# Patient Record
Sex: FEMALE | Race: BLACK OR AFRICAN AMERICAN | NOT HISPANIC OR LATINO | Employment: FULL TIME | URBAN - METROPOLITAN AREA
[De-identification: names, ages, dates, MRNs, and addresses within clinical notes are randomized per-mention and may not be internally consistent; named-entity substitution may affect disease eponyms.]

---

## 2019-03-20 ENCOUNTER — OFFICE VISIT (OUTPATIENT)
Dept: URGENT CARE | Facility: CLINIC | Age: 19
End: 2019-03-20
Payer: COMMERCIAL

## 2019-03-20 VITALS
OXYGEN SATURATION: 100 % | HEIGHT: 56 IN | SYSTOLIC BLOOD PRESSURE: 124 MMHG | TEMPERATURE: 99.3 F | HEART RATE: 89 BPM | DIASTOLIC BLOOD PRESSURE: 80 MMHG | RESPIRATION RATE: 16 BRPM | WEIGHT: 106 LBS | BODY MASS INDEX: 23.84 KG/M2

## 2019-03-20 DIAGNOSIS — J02.9 SORE THROAT: ICD-10-CM

## 2019-03-20 DIAGNOSIS — J11.1 INFLUENZA: ICD-10-CM

## 2019-03-20 LAB
INT CON NEG: NEGATIVE
INT CON POS: POSITIVE
S PYO AG THROAT QL: NEGATIVE

## 2019-03-20 PROCEDURE — 87880 STREP A ASSAY W/OPTIC: CPT | Performed by: EMERGENCY MEDICINE

## 2019-03-20 PROCEDURE — 99203 OFFICE O/P NEW LOW 30 MIN: CPT | Performed by: EMERGENCY MEDICINE

## 2019-03-20 RX ORDER — OSELTAMIVIR PHOSPHATE 75 MG/1
75 CAPSULE ORAL 2 TIMES DAILY
Qty: 10 CAP | Refills: 0 | Status: SHIPPED | OUTPATIENT
Start: 2019-03-20 | End: 2020-02-23

## 2019-03-20 ASSESSMENT — ENCOUNTER SYMPTOMS
FEVER: 1
HEADACHES: 1
COUGH: 1
FOCAL WEAKNESS: 0
NAUSEA: 1
FATIGUE: 1
SENSORY CHANGE: 0
MYALGIAS: 1
SWOLLEN GLANDS: 1
VOMITING: 0
SHORTNESS OF BREATH: 0
HEMOPTYSIS: 0
ARTHRALGIAS: 1
CHANGE IN BOWEL HABIT: 0
WHEEZING: 0
ABDOMINAL PAIN: 0
SPUTUM PRODUCTION: 0
SINUS PAIN: 0
SORE THROAT: 1
DIARRHEA: 0

## 2019-03-20 NOTE — LETTER
March 20, 2019       Patient: Brenda Stephen   YOB: 2000   Date of Visit: 3/20/2019         To Whom It May Concern:    Brenda Stephen is not able to attend work March 19-21, 2019 for medical reasons.      Sincerely,          Golden Zhong M.D.  Electronically Signed

## 2019-03-21 NOTE — PROGRESS NOTES
"Subjective:      Brenda Stephen is a 18 y.o. female who presents with Pharyngitis (hx of strep sore throat, nose stuffy,coughing, sneezing, bodyache x last night)            Influenza   This is a new problem. The current episode started yesterday. The problem occurs daily. The problem has been gradually worsening. Associated symptoms include arthralgias, congestion, coughing, fatigue, a fever, headaches, myalgias, nausea, a sore throat and swollen glands. Pertinent negatives include no abdominal pain, change in bowel habit, chest pain, rash, urinary symptoms or vomiting. The symptoms are aggravated by swallowing. She has tried rest for the symptoms. The treatment provided no relief.   PMH anemia  Review of Systems   Constitutional: Positive for fatigue and fever.   HENT: Positive for congestion, nosebleeds and sore throat. Negative for ear pain and sinus pain.    Respiratory: Positive for cough. Negative for hemoptysis, sputum production, shortness of breath and wheezing.    Cardiovascular: Negative for chest pain.   Gastrointestinal: Positive for nausea. Negative for abdominal pain, change in bowel habit, diarrhea and vomiting.   Musculoskeletal: Positive for arthralgias and myalgias.   Skin: Negative for rash.   Neurological: Positive for headaches. Negative for sensory change and focal weakness.   Endo/Heme/Allergies: Negative for environmental allergies.     PMH:  has no past medical history on file.  MEDS:   Current Outpatient Prescriptions:   •  oseltamivir (TAMIFLU) 75 MG Cap, Take 1 Cap by mouth 2 times a day., Disp: 10 Cap, Rfl: 0  ALLERGIES: Not on File  SURGHX: History reviewed. No pertinent surgical history.  SOCHX:  reports that she has quit smoking. She has never used smokeless tobacco. She reports that she drinks alcohol. She reports that she does not use drugs.  FH: family history is not on file.       Objective:     /80   Pulse 89   Temp 37.4 °C (99.3 °F)   Resp 16   Ht 1.422 m (4' 8\")   Wt " 48.1 kg (106 lb)   SpO2 100%   BMI 23.76 kg/m²      Physical Exam   Constitutional: She is oriented to person, place, and time. She appears well-developed and well-nourished. She is cooperative.  Non-toxic appearance. No distress.   HENT:   Head: Normocephalic.   Right Ear: Tympanic membrane and ear canal normal.   Left Ear: Tympanic membrane and ear canal normal.   Nose: Mucosal edema present. No rhinorrhea.   Mouth/Throat: No trismus in the jaw. No uvula swelling. Posterior oropharyngeal erythema present. No oropharyngeal exudate, posterior oropharyngeal edema or tonsillar abscesses. Tonsils are 1+ on the right. Tonsils are 1+ on the left. No tonsillar exudate.   Eyes: Conjunctivae are normal.   Neck: Trachea normal. Neck supple.   Cardiovascular: Normal rate, regular rhythm and normal heart sounds.    Pulmonary/Chest: Effort normal and breath sounds normal.   Lymphadenopathy:     She has cervical adenopathy.        Right cervical: Superficial cervical adenopathy present. No posterior cervical adenopathy present.       Left cervical: Superficial cervical adenopathy present. No posterior cervical adenopathy present.   Neurological: She is alert and oriented to person, place, and time.   Skin: Skin is warm and dry.   Psychiatric: She has a normal mood and affect.               Assessment/Plan:     1. Sore throat  negative- POCT Rapid Strep A    2. Influenza  Recommended supportive care measures, including rest, increasing oral fluid intake and use of over-the-counter medications for relief of symptoms.  Offered:  - oseltamivir (TAMIFLU) 75 MG Cap; Take 1 Cap by mouth 2 times a day.  Dispense: 10 Cap; Refill: 0

## 2019-10-15 ENCOUNTER — TELEPHONE (OUTPATIENT)
Dept: SCHEDULING | Facility: IMAGING CENTER | Age: 19
End: 2019-10-15

## 2019-10-26 ENCOUNTER — OCCUPATIONAL MEDICINE (OUTPATIENT)
Dept: URGENT CARE | Facility: CLINIC | Age: 19
End: 2019-10-26
Payer: COMMERCIAL

## 2019-10-26 VITALS
BODY MASS INDEX: 22.5 KG/M2 | WEIGHT: 100 LBS | RESPIRATION RATE: 18 BRPM | HEART RATE: 100 BPM | HEIGHT: 56 IN | OXYGEN SATURATION: 98 % | SYSTOLIC BLOOD PRESSURE: 110 MMHG | DIASTOLIC BLOOD PRESSURE: 68 MMHG | TEMPERATURE: 99.9 F

## 2019-10-26 DIAGNOSIS — M77.8 TENDINITIS OF BOTH HANDS: ICD-10-CM

## 2019-10-26 PROCEDURE — 99213 OFFICE O/P EST LOW 20 MIN: CPT | Performed by: EMERGENCY MEDICINE

## 2019-10-26 ASSESSMENT — ENCOUNTER SYMPTOMS
NECK PAIN: 0
SENSORY CHANGE: 0

## 2019-10-26 NOTE — PROGRESS NOTES
"Subjective:      Brenda Stephen is a 18 y.o. female who presents with Hand Pain (Both hands, wrists, arms going on for 2 months )      Date of injury: 10/24/2019. Injured at work: no specific trauma; notes onset of left hand/wrist/forearm pain associated with gripping trigger activity. Previous injury: Notes onset of right hand/wrist/forearm pain associated with work activity 2 months ago.  No specific trauma. Outside activity: none. Contributing medical illness: none. Severity: moderate, severe. Prior treatment: Wrist splints, ibuprofen. Location: bilateral palmar hands, wrists, forearms. Numbness/tingling: none. Focal weakness: none. Fever: none.     HPI    Review of Systems   Musculoskeletal: Negative for neck pain.   Skin: Negative for rash.   Neurological: Negative for sensory change.     PMH: No pertinent past medical history to this problem  MEDS: Medications were reviewed in EMR  ALLERGIES: Allergies were reviewed in EMR  SOCHX: Works as a   FH: No pertinent family history to this problem       Objective:     /68   Pulse 100   Temp 37.7 °C (99.9 °F) (Temporal)   Resp 18   Ht 1.422 m (4' 8\")   Wt 45.4 kg (100 lb)   SpO2 98%   BMI 22.42 kg/m²      Physical Exam    General: Alert, cooperative, no acute distress.  Body habitus asthenic.  Musculoskeletal- bilateral arms: Mild tenderness without swelling bilateral distal volar forearms, anterior wrists, proximal hands.  Tendon function deficit, normal joint range of motion.  Vascular: Bilateral radial pulses intact, cap refill intact.  Neurological: Normal bilateral motor function, sensory to light touch grossly intact bilaterally.  Skin: Warm, dry, intact.       Assessment/Plan:     1. Tendinitis of both hands  C4, D39 form completed.  On wrist splints as needed, ibuprofen as needed  Referral to occupational medicine    "

## 2019-10-26 NOTE — LETTER
"EMPLOYEE’S CLAIM FOR COMPENSATION/ REPORT OF INITIAL TREATMENT  FORM C-4    EMPLOYEE’S CLAIM - PROVIDE ALL INFORMATION REQUESTED   First Name  Brenda Last Name  Zafar Birthdate                    2000                Sex  female Claim Number   Home Address  [address] Age  18 y.o. Height  1.422 m (4' 8\") Weight  45.4 kg (100 lb) [facility]     [place] Zip  [address] Telephone  [phone] (home)    Mailing Address  [address] Zip  [address] Primary Language Spoken  English    Insurer  Unknown Third Party   [employer]   Employee's Occupation (Job Title) When Injury or Occupational Disease Occurred      Employer's Name  [employer]  Telephone  [phone]    Employer Address  [address] [place]  Zip  [address]    Date of Injury  10/25/2019               Hour of Injury  11:00 AM Date Employer Notified  10/25/2019 Last Day of Work after Injury or Occupational Disease  10/25/2019 Supervisor to Whom Injury Reported  Kajal Cross   Address or Location of Accident (if applicable)  [[id number]   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 (Be specific an answer in detail. Use additional sheet if necessary)  repetitive motions on production line, and stress on hands and wrist   If you believe that you have an occupational disease, when did you first have knowledge of the disability and it relationship to your employment?  N/A Witnesses to the Accident  N/A      Nature of Injury or Occupational Disease  Defer  Part(s) of Body Injured or Affected  Wrist (L) and Hand (L), Wrist (R) and Hand (R), Lower Arm (R)    I certify that the above is true and correct to the best of my knowledge and that I have provided this information in " order to obtain the benefits of [place] or [place]).  I hereby authorize any physician, chiropractor, surgeon, practitioner, or other person, any hospital, including [facility] or [facility] hospital, any medical service organization, any insurance company, or other institution or organization to release to each other, any medical or other information, including benefits paid or payable, pertinent to this injury or disease, except information relative to diagnosis, treatment and/or counseling for AIDS, psychological conditions, alcohol or controlled substances, for which I must give specific authorization.  A Photostat of this authorization shall be as valid as the original.     Date   Place   Employee’s Signature   THIS REPORT MUST BE COMPLETED AND MAILED WITHIN 3 WORKING DAYS OF TREATMENT   Place  [facility]  Name of [facility]   Date  10/26/2019 Diagnosis  (M77.9) Tendinitis of both hands Is there evidence the injured employee was under the influence of alcohol and/or another controlled substance at the time of accident?   Hour  4:27 PM Description of Injury or Disease  The encounter diagnosis was Tendinitis of both hands. No   Treatment  Wrist splints, OTC ibuprofen  Have you advised the patient to remain off work five days or more? No   X-Ray Findings      If Yes   From Date  To Date      From information given by the employee, together with medical evidence, can you directly connect this injury or occupational disease as job incurred?  No If No Full Duty No Modified Duty  Yes   Is additional medical care by a physician indicated?  Yes If Modified Duty, Specify any Limitations / Restrictions  Limited bilateral hand , lifting   Do you know of any previous injury or disease contributing to this condition or occupational disease?                            No   Date  10/26/2019 Print  "Doctor’s Name Golden Zhong M.D. I certify the employer’s copy of  this form was mailed on:   Address  [address]’s Use Only     [place] Zip  [address]    Provider’s Tax ID Number  [id number] Telephone  Dept: [phone]        manda-GOLDEN Daniels M.D.   e-Signature: Dr. Paul Reina,   Medical Director Degree  MD        ORIGINAL-TREATING PHYSICIAN OR CHIROPRACTOR    PAGE 2-INSURER/TPA    PAGE 3-EMPLOYER    PAGE 4-EMPLOYEE             Form C-4 (rev.10/07)              BRIEF DESCRIPTION OF RIGHTS AND BENEFITS  (Pursuant to [place] 616C.050)    Notice of Injury or Occupational Disease (Incident Report Form C-1): If an injury or occupational disease (OD) arises out of and in the course of employment, you must provide written notice to your employer as soon as practicable, but no later than 7 days after the accident or OD. Your employer shall maintain a sufficient supply of the required forms.    Claim for Compensation (Form C-4): If medical treatment is sought, the form C-4 is available at the place of initial treatment. A completed \"Claim for Compensation\" (Form C-4) must be filed within 90 days after an accident or OD. The treating physician or chiropractor must, within 3 working days after treatment, complete and mail to the employer, the employer's insurer and third-party , the Claim for Compensation.    Medical Treatment: If you require medical treatment for your on-the-job injury or OD, you may be required to select a physician or chiropractor from a list provided by your workers’ compensation insurer, if it has contracted with an Organization for Managed Care (MCO) or Preferred Provider Organization (PPO) or providers of health care. If your employer has not entered into a contract with an MCO or PPO, you may select a physician or chiropractor from the Panel of Physicians and Chiropractors. Any medical costs related to your industrial injury or OD will be paid by " your insurer.    Temporary Total Disability (TTD): If your doctor has certified that you are unable to work for a period of at least 5 consecutive days, or 5 cumulative days in a 20-day period, or places restrictions on you that your employer does not accommodate, you may be entitled to TTD compensation.    Temporary Partial Disability (TPD): If the wage you receive upon reemployment is less than the compensation for TTD to which you are entitled, the insurer may be required to pay you TPD compensation to make up the difference. TPD can only be paid for a maximum of 24 months.    Permanent Partial Disability (PPD): When your medical condition is stable and there is an indication of a PPD as a result of your injury or OD, within 30 days, your insurer must arrange for an evaluation by a rating physician or chiropractor to determine the degree of your PPD. The amount of your PPD award depends on the date of injury, the results of the PPD evaluation and your age and wage.    Permanent Total Disability (PTD): If you are medically certified by a treating physician or chiropractor as permanently and totally disabled and have been granted a PTD status by your insurer, you are entitled to receive monthly benefits not to exceed 66 2/3% of your average monthly wage. The amount of your PTD payments is subject to reduction if you previously received a PPD award.    Vocational Rehabilitation Services: You may be eligible for vocational rehabilitation services if you are unable to return to the job due to a permanent physical impairment or permanent restrictions as a result of your injury or occupational disease.    Transportation and Per Angelica Reimbursement: You may be eligible for travel expenses and per angelica associated with medical treatment.    Reopening: You may be able to reopen your claim if your condition worsens after claim closure.    Appeal Process: If you disagree with a written determination issued by the insurer or  the insurer does not respond to your request, you may appeal to the Department of Administration, , by following the instructions contained in your determination letter. You must appeal the determination within 70 days from the date of the determination letter at [address], [address], [place], [place] [address], or [address], [address], [place], [place] [address]. If you disagree with the  decision, you may appeal to the Department of Administration, . You must file your appeal within 30 days from the date of the  decision letter at [address], [address], [place], [place] [address], or [address], [address], [place], [place] [address]. If you disagree with a decision of an , you may file a petition for judicial review with the District Court. You must do so within 30 days of the Appeal Officer’s decision. You may be represented by an  at your own expense or you may contact the [facility] for possible representation.    [place]  for Injured Workers (NAIW): If you disagree with a  decision, you may request that NAIW represent you without charge at an  Hearing. For information regarding denial of benefits, you may contact the [facility] at: [address], [address], [place] [address], [phone], or [address], [place] [address], [phone]    To File a Complaint with the Division: If you wish to file a complaint with the  of the Division of Industrial Relations (DIR),  please contact the Workers’ Compensation Section, [address], [address], [place], [place] [address], telephone (915) [phone], or [address], [address], [place], [place] [address], telephone [phone]) [phone].    For assistance with Workers’ Compensation Issues: You may contact the Office of the Governor Consumer Health Assistance, [address], [place], [place] [address], Toll Free 1-852.156.9475, Web site: http://govcha.[facility].nv.us, E-mail [email or web address].                   __________________________________________________________________                                                     _________        Employee Name / Signature                                                                                                                                              Date                                                                                                                                                                                                     D-2 (rev. 06/18)

## 2019-10-26 NOTE — LETTER
[facility] [address] [place] [address]  Phone:  [phone] - Fax:  [phone]   [facility] Progress Report and Disability Certification  Date of Service: 10/26/2019   No Show:  No  Date / Time of Next Visit: 10/30/2019 @ 11:30 am w/ occ health   Claim Information   Patient Name: Brenda Stephen  Claim Number:     Employer: [employer]  Date of Injury: 10/25/2019     Insurer / TPA: [employer]  ID / SSN:     Occupation:   Diagnosis: The encounter diagnosis was Tendinitis of both hands.    Medical Information   Related to Industrial Injury? No    Subjective Complaints:  Date of injury: 10/24/2019. Injured at work: no specific trauma; notes onset of left hand/wrist/forearm pain associated with gripping trigger activity. Previous injury: Notes onset of right hand/wrist/forearm pain associated with work activity 2 months ago.  No specific trauma. Outside activity: none. Contributing medical illness: none. Severity: moderate, severe. Prior treatment: Wrist splints, ibuprofen. Location: bilateral palmar hands, wrists, forearms. Numbness/tingling: none. Focal weakness: none. Fever: none.   Objective Findings: General: Alert, cooperative, no acute distress.  Body habitus asthenic.  Musculoskeletal- bilateral arms: Mild tenderness without swelling bilateral distal volar forearms, anterior wrists, proximal hands.  Tendon function deficit, normal joint range of motion.  Vascular: Bilateral radial pulses intact, cap refill intact.  Neurological: Normal bilateral motor function, sensory to light touch grossly intact bilaterally.  Skin: Warm, dry, intact.   Pre-Existing Condition(s):     Assessment:   Initial Visit    Status: Additional Care Required  Permanent Disability:No    Plan: Medication    Diagnostics:      Comments:       Disability Information   Status: Released to Restricted Duty    From:  10/26/2019  Through: 10/30/2019 Restrictions  are:     Physical Restrictions   Sitting:    Standing:    Stooping:    Bending:      Squatting:    Walking:    Climbing:    Pushing:      Pulling:    Other:    Reaching Above Shoulder (L):   Reaching Above Shoulder (R):       Reaching Below Shoulder (L):    Reaching Below Shoulder (R):      Not to exceed Weight Limits   Carrying(hrs):   Weight Limit(lb): < or = to 25 pounds Lifting(hrs):   Weight  Limit(lb): < or = to 25 pounds   Comments:      Repetitive Actions   Hands: i.e. Fine Manipulations from Grasping: < or = to 1 hr/day   Feet: i.e. Operating Foot Controls:     Driving / Operate Machinery:     Physician Name: Golden Zhong M.D. Physician Signature: GOLDEN Phillips M.D. e-Signature: Dr. Paul Reina, Medical Director   Clinic Name / Location: [address] [address] Clinic Phone Number: Dept: [phone]   Appointment Time: 4:00 Pm Visit Start Time: 4:27 PM   Check-In Time:  3:57 Pm Visit Discharge Time:  5:16 PM   Original-Treating Physician or Chiropractor    Page 2-Insurer/TPA    Page 3-Employer    Page 4-Employee

## 2019-10-29 ENCOUNTER — TELEPHONE (OUTPATIENT)
Dept: OCCUPATIONAL MEDICINE | Facility: CLINIC | Age: 19
End: 2019-10-29

## 2019-10-30 ENCOUNTER — OCCUPATIONAL MEDICINE (OUTPATIENT)
Dept: OCCUPATIONAL MEDICINE | Facility: CLINIC | Age: 19
End: 2019-10-30
Payer: COMMERCIAL

## 2019-10-30 VITALS
RESPIRATION RATE: 12 BRPM | HEIGHT: 56 IN | TEMPERATURE: 99 F | WEIGHT: 100 LBS | BODY MASS INDEX: 22.5 KG/M2 | HEART RATE: 88 BPM | OXYGEN SATURATION: 99 %

## 2019-10-30 DIAGNOSIS — M77.8 TENDONITIS OF BOTH WRISTS: ICD-10-CM

## 2019-10-30 PROCEDURE — 99213 OFFICE O/P EST LOW 20 MIN: CPT | Mod: 29 | Performed by: NURSE PRACTITIONER

## 2019-10-30 ASSESSMENT — ENCOUNTER SYMPTOMS
RESPIRATORY NEGATIVE: 1
MYALGIAS: 1
CARDIOVASCULAR NEGATIVE: 1
CONSTITUTIONAL NEGATIVE: 1
PSYCHIATRIC NEGATIVE: 1
TINGLING: 0
SENSORY CHANGE: 1

## 2019-10-30 ASSESSMENT — PAIN SCALES - GENERAL: PAINLEVEL: 4=SLIGHT-MODERATE PAIN

## 2019-10-30 NOTE — PROGRESS NOTES
"Subjective:      Brenda Stephen is a 18 y.o. female who presents with Follow-Up (WC DOI 10/25/2019 R/L Arms)      Date of injury: 10/24/2019. Injured at work: no specific trauma; notes onset of left hand/wrist/forearm pain associated with gripping trigger activity. Previous injury: Notes onset of right hand/wrist/forearm pain associated with work activity 2 months ago.      Today patient states overall no improvement with wrist pain.  Pain is described as constant burning that radiates from wrist to fingertips in both hands, dull, and achy.  Symptoms are exacerbated with repetitive movements and relieved with rest.  Patient states that she has been able to rest her wrist because she has been off work for the last few days.  Patient states that she is tried ibuprofen with mild relief of swelling.  Patient states that she is tried ice and heat, heat feels better.  Patient states that she has been wearing wrist braces on both hands which provides some relief.  Patient states that has not returned to work yet, unsure if work restrictions will be helpful.  Plan of care discussed with patient.     HPI    Review of Systems   Constitutional: Negative.    Respiratory: Negative.    Cardiovascular: Negative.    Musculoskeletal: Positive for joint pain and myalgias.   Skin: Negative.    Neurological: Positive for sensory change. Negative for tingling.   Psychiatric/Behavioral: Negative.         ROS: All systems were reviewed on intake form, form was reviewed and signed. See scanned documents in media. Pertinent positives and negatives included in HPI.    PMH: No pertinent past medical history to this problem  MEDS: Medications were reviewed in Epic  ALLERGIES: No Known Allergies  SOCHX: Works as  at [employer]   FH: No pertinent family history to this problem         Objective:     Pulse 88   Temp 37.2 °C (99 °F) (Temporal)   Resp 12   Ht 1.422 m (4' 8\")   Wt 45.4 kg (100 lb)   SpO2 99%   BMI 22.42 kg/m²  "     Physical Exam   Constitutional: She is oriented to person, place, and time. She appears well-developed and well-nourished.   Cardiovascular: Normal rate and regular rhythm.   Pulmonary/Chest: Effort normal.   Musculoskeletal: Normal range of motion. She exhibits tenderness. She exhibits no edema or deformity.        Right wrist: She exhibits tenderness. She exhibits normal range of motion, no swelling, no crepitus and no deformity.        Left wrist: She exhibits tenderness and bony tenderness. She exhibits normal range of motion, no crepitus and no deformity.        Arms:  Neurological: She is alert and oriented to person, place, and time.   Skin: Skin is warm and dry. Capillary refill takes less than 2 seconds. No erythema.   Psychiatric: She has a normal mood and affect. Her behavior is normal.       Mild tenderness without swelling bilateral distal volar forearms, anterior wrists, proximal hands.  Tendon function deficit, normal joint range of motion.  Vascular: Bilateral radial pulses intact, cap refill intact.  Neurological: Normal bilateral motor function, sensory to light touch grossly intact bilaterally  Tinel's and Phalen's positive  Finkelstein negative       Assessment/Plan:     1. Tendonitis of both wrists      Follow-up in 2 weeks   Work restrictions applied   Hand therapy referral placed   Continue with over-the-counter ibuprofen as needed   Apply diclofenac gel to the affected areas as prescribed   Continue with gentle range of motion and stretching as tolerated   Continue with ice and heat as tolerated   Continue with wrist brace as tolerated

## 2019-10-30 NOTE — LETTER
[address],   [address] [place] [address]  Phone:  [phone] - Fax:  [phone]   [facility] Progress Report and Disability Certification  Date of Service: 10/30/2019   No Show:  No  Date / Time of Next Visit: 11/13/2019 @ 11:30am   Claim Information   Patient Name: Brenda Stephen  Claim Number:     Employer: [employer]  Date of Injury: 10/25/2019     Insurer / TPA: [employer]  ID / SSN:     Occupation:   Diagnosis: The encounter diagnosis was Tendonitis of both wrists.    Medical Information   Related to Industrial Injury?   Comments:Indeterminate    Subjective Complaints:  Date of injury: 10/24/2019. Injured at work: no specific trauma; notes onset of left hand/wrist/forearm pain associated with gripping trigger activity. Previous injury: Notes onset of right hand/wrist/forearm pain associated with work activity 2 months ago.      Today patient states overall no improvement with wrist pain.  Pain is described as constant burning that radiates from wrist to fingertips in both hands, dull, and achy.  Symptoms are exacerbated with repetitive movements and relieved with rest.  Patient states that she has been able to rest her wrist because she has been off work for the last few days.  Patient states that she is tried ibuprofen with mild relief of swelling.  Patient states that she is tried ice and heat, heat feels better.  Patient states that she has been wearing wrist braces on both hands which provides some relief.  Patient states that has not returned to work yet, unsure if work restrictions will be helpful.  Plan of care discussed with patient.   Objective Findings: Mild tenderness without swelling bilateral distal volar forearms, anterior wrists, proximal hands.  Tendon function deficit, normal joint range of motion.  Vascular: Bilateral radial pulses intact, cap refill intact.  Neurological: Normal bilateral motor  function, sensory to light touch grossly intact bilaterally  Tinel's and Phalen's positive  Finkelstein negative   Pre-Existing Condition(s):     Assessment:   Condition Same    Status: Additional Care Required  Permanent Disability:No    Plan: PT    Diagnostics:      Comments:  Follow-up in 2 weeks  Work restrictions applied  Hand therapy referral placed  Continue with over-the-counter ibuprofen as needed  Apply diclofenac gel to the affected areas as prescribed  Continue with gentle range of motion and stretching as tolera  catia  Continue with ice and heat as tolerated  Continue with wrist brace as tolerated      Disability Information   Status: Released to Restricted Duty    From:  10/30/2019  Through: 11/13/2019 Restrictions are: Temporary   Physical Restrictions   Sitting:    Standing:    Stooping:    Bending:      Squatting:    Walking:    Climbing:    Pushing:  < or = to 2 hrs/day   Pulling:  < or = to 2 hrs/day Other:    Reaching Above Shoulder (L):   Reaching Above Shoulder (R):       Reaching Below Shoulder (L):    Reaching Below Shoulder (R):      Not to exceed Weight Limits   Carrying(hrs):   Weight Limit(lb): < or = to 10 pounds Lifting(hrs):   Weight  Limit(lb): < or = to 10 pounds   Comments: May take 15-minute breaks every 2 hours to manage symptoms.  Rotate stations and fine manipulation with grasping every 2 hours.    Repetitive Actions   Hands: i.e. Fine Manipulations from Grasping: < or = to 4 hrs/day  Comments:Lipid right and left hands and wrists   Feet: i.e. Operating Foot Controls:     Driving / Operate Machinery:     Physician Name: BE Garcia Physician Signature:   e-Signature: Dr. Paul Reina, Medical Director   Clinic Name / Location: [address],   [address] [address] Clinic Phone Number: Dept: [phone]   Appointment Time: 11:30 Am Visit Start Time: 11:35 AM   Check-In Time:  11:20 Am Visit Discharge Time:  12:30am      Original-Treating Physician or Chiropractor    Page 2-Insurer/TPA    Page 3-Employer    Page 4-Employee

## 2019-11-13 ENCOUNTER — OCCUPATIONAL MEDICINE (OUTPATIENT)
Dept: OCCUPATIONAL MEDICINE | Facility: CLINIC | Age: 19
End: 2019-11-13
Payer: COMMERCIAL

## 2019-11-13 VITALS
OXYGEN SATURATION: 98 % | TEMPERATURE: 98.9 F | SYSTOLIC BLOOD PRESSURE: 120 MMHG | DIASTOLIC BLOOD PRESSURE: 76 MMHG | HEIGHT: 59 IN | BODY MASS INDEX: 20.16 KG/M2 | RESPIRATION RATE: 14 BRPM | WEIGHT: 100 LBS

## 2019-11-13 DIAGNOSIS — M77.8 TENDONITIS OF BOTH WRISTS: ICD-10-CM

## 2019-11-13 PROCEDURE — 99213 OFFICE O/P EST LOW 20 MIN: CPT | Mod: 29 | Performed by: NURSE PRACTITIONER

## 2019-11-13 ASSESSMENT — ENCOUNTER SYMPTOMS
CONSTITUTIONAL NEGATIVE: 1
SENSORY CHANGE: 1
MYALGIAS: 1
RESPIRATORY NEGATIVE: 1
CARDIOVASCULAR NEGATIVE: 1
PSYCHIATRIC NEGATIVE: 1
WEAKNESS: 1
TINGLING: 1

## 2019-11-13 NOTE — PROGRESS NOTES
"Subjective:      Brenda Stephen is a 18 y.o. female who presents with Follow-Up (WC DOI 10/24/2019 Rt and Lt Arms, same, rm 16)      Date of injury: 10/24/2019. Injured at work: no specific trauma; notes onset of left hand/wrist/forearm pain associated with gripping trigger activity. Previous injury: Notes onset of right hand/wrist/forearm pain associated with work activity 2 months ago.       Today patient states overall no improvement with wrist pain.  Pain is described as constant tightness, burning that radiates from wrist to fingertips in both hands, dull, and achy pain.  Symptoms are exacerbated with repetitive movements and relieved with rest.  Patient ibuprofen provides minimal relief of swelling, but helps with the tightness.  Patient states that she is tried ice and heat, heat feels better.  Patient states that she has been wearing wrist braces on both hands which provides some relief, especially at night.  Patient is tolerating work restrictions.  Patient provided hand therapy referral information so that she can schedule her appointments. Plan of care discussed with patient.      HPI    Review of Systems   Constitutional: Negative.    Respiratory: Negative.    Cardiovascular: Negative.    Musculoskeletal: Positive for joint pain and myalgias.   Skin: Negative.    Neurological: Positive for tingling, sensory change and weakness.   Psychiatric/Behavioral: Negative.         [employer]CH[employer]: Works as a  at [employer]  FH: No pertinent family history to this problem.         Objective:     /76   Temp 37.2 °C (98.9 °F)   Resp 14   Ht 1.499 m (4' 11\")   Wt 45.4 kg (100 lb)   SpO2 98%   BMI 20.20 kg/m²      Physical Exam  Constitutional:       Appearance: Normal appearance.   Cardiovascular:      Rate and Rhythm: Normal rate and regular rhythm.   Pulmonary:      Effort: Pulmonary effort is normal.   Musculoskeletal: Normal range of motion.         General: Tenderness and signs of injury present. " No swelling or deformity.        Arms:    Skin:     General: Skin is warm and dry.      Capillary Refill: Capillary refill takes less than 2 seconds.      Findings: No bruising or erythema.   Neurological:      General: No focal deficit present.      Mental Status: She is alert and oriented to person, place, and time.      Cranial Nerves: No cranial nerve deficit.      Sensory: Sensory deficit present.      Motor: No weakness.      Coordination: Coordination normal.      Gait: Gait normal.      Deep Tendon Reflexes: Reflexes normal.   Psychiatric:         Mood and Affect: Mood normal.         Behavior: Behavior normal.         Mild tenderness without swelling bilateral distal volar forearms, anterior wrists, proximal hands.  Tendon function deficit, normal joint range of motion.  Vascular: Bilateral radial pulses intact, cap refill intact.  Neurological: Normal bilateral motor function, sensory to light touch grossly intact bilaterally  Tinel's and Phalen's positive  Finkelstein negative       Assessment/Plan:       1. Tendonitis of both wrists      Follow-up in 3 weeks   Work restrictions applied   Hand therapy appointments as scheduled   Continue with OTC ibuprofen as needed   Apply diclofenac gel to the affected areas as prescribed   Continue with gentle range of motion and stretching as tolerated   Continue with ice and heat as tolerated   Continue with wrist brace as tolerated

## 2019-11-13 NOTE — LETTER
[address],   [address] [place] [address]  Phone:  [phone] - Fax:  [phone]   [facility] Progress Report and Disability Certification  Date of Service: 11/13/2019   No Show:  No  Date / Time of Next Visit: 12/3/19 @ 11:30AM   Claim Information   Patient Name: Brenda Stephen  Claim Number:     Employer: [employer]  Date of Injury: 10/25/2019     Insurer / TPA: [employer]  ID / SSN:     Occupation:   Diagnosis: The encounter diagnosis was Tendonitis of both wrists.    Medical Information   Related to Industrial Injury?   Comments:Indeterminant    Subjective Complaints:  Date of injury: 10/24/2019. Injured at work: no specific trauma; notes onset of left hand/wrist/forearm pain associated with gripping trigger activity. Previous injury: Notes onset of right hand/wrist/forearm pain associated with work activity 2 months ago.       Today patient states overall no improvement with wrist pain.  Pain is described as constant tightness, burning that radiates from wrist to fingertips in both hands, dull, and achy pain.  Symptoms are exacerbated with repetitive movements and relieved with rest.  Patient ibuprofen provides minimal relief of swelling, but helps with the tightness.  Patient states that she is tried ice and heat, heat feels better.  Patient states that she has been wearing wrist braces on both hands which provides some relief, especially at night.  Patient is tolerating work restrictions.  Patient provided hand therapy referral information so that she can schedule her appointments. Plan of care discussed with patient.    Objective Findings: Mild tenderness without swelling bilateral distal volar forearms, anterior wrists, proximal hands.  Tendon function deficit, normal joint range of motion.  Vascular: Bilateral radial pulses intact, cap refill intact.  Neurological: Normal bilateral motor function, sensory to  light touch grossly intact bilaterally  Tinel's and Phalen's positive  Finkelstein negative   Pre-Existing Condition(s):     Assessment:   Condition Same    Status: Additional Care Required  Permanent Disability:No    Plan: PT    Diagnostics:      Comments:  Follow-up in 3 weeks   Work restrictions applied   Hand therapy appointments as scheduled  Continue with OTC ibuprofen as needed   Apply diclofenac gel to the affected areas as prescribed   Continue with gentle range of motion and stretching as venkat  ated   Continue with ice and heat as tolerated   Continue with wrist brace as tolerated     Disability Information   Status: Released to Restricted Duty    From:  11/13/2019  Through: 12/4/2019 Restrictions are: Temporary   Physical Restrictions   Sitting:    Standing:    Stooping:    Bending:      Squatting:    Walking:    Climbing:    Pushing:  < or = to 2 hrs/day  Comments:Limited right and left hands and wrists   Pulling:  < or = to 2 hrs/day  Comments:Limited right and left hands and wrists Other:    Reaching Above Shoulder (L):   Reaching Above Shoulder (R):       Reaching Below Shoulder (L):    Reaching Below Shoulder (R):      Not to exceed Weight Limits   Carrying(hrs):   Weight Limit(lb): < or = to 10 pounds Lifting(hrs):   Weight  Limit(lb): < or = to 10 pounds   Comments: May take 15-minute breaks every 2 hours to manage symptoms.  Rotate stations and fine manipulation with grasping every 2 hours.     Repetitive Actions   Hands: i.e. Fine Manipulations from Grasping: < or = to 6 hrs/day  Comments:Limited use of right and left hands and wrists   Feet: i.e. Operating Foot Controls:     Driving / Operate Machinery:     Physician Name: BE Garcia Physician Signature:   e-Signature: Dr. Paul Reina, Medical Director   Clinic Name / Location: [address],   [address]  [place] [address] Clinic Phone Number: Dept: [phone]   Appointment Time: 11:30 Am  Visit Start Time: 11:40 AM   Check-In Time:  11:29 Am Visit Discharge Time: 11:57AM   Original-Treating Physician or Chiropractor    Page 2-Insurer/TPA    Page 3-Employer    Page 4-Employee

## 2019-11-15 ENCOUNTER — OCCUPATIONAL MEDICINE (OUTPATIENT)
Dept: URGENT CARE | Facility: CLINIC | Age: 19
End: 2019-11-15
Payer: COMMERCIAL

## 2019-11-15 VITALS
HEART RATE: 95 BPM | TEMPERATURE: 99.6 F | DIASTOLIC BLOOD PRESSURE: 78 MMHG | HEIGHT: 59 IN | OXYGEN SATURATION: 99 % | SYSTOLIC BLOOD PRESSURE: 112 MMHG | BODY MASS INDEX: 20.36 KG/M2 | WEIGHT: 101 LBS | RESPIRATION RATE: 12 BRPM

## 2019-11-15 DIAGNOSIS — M77.8 WRIST TENDONITIS: ICD-10-CM

## 2019-11-15 PROCEDURE — 99214 OFFICE O/P EST MOD 30 MIN: CPT | Performed by: FAMILY MEDICINE

## 2019-11-15 NOTE — LETTER
[facility] [address] [place] [address]  Phone:  [phone] - Fax:  [phone]   [facility] Progress Report and Disability Certification  Date of Service: 11/15/2019   No Show:  No  Date / Time of Next Visit: 11/22/2019 @ 11:45 am w/ [facility]   Claim Information   Patient Name: Brenda Stephen  Claim Number:     Employer: [employer]  Date of Injury: 10/25/2019     Insurer / TPA: [employer]  ID / SSN:     Occupation:   Diagnosis: The encounter diagnosis was Wrist tendonitis.    Medical Information   Related to Industrial Injury? Yes    Subjective Complaints:    Here for f/u:     Date of injury: 10/24/2019. Injured at work: no specific trauma; notes onset of left hand/wrist/forearm pain associated with gripping trigger activity.            She states that the rt wrist pain is getting worse.    She feels that the wrist splints are somewhat helpful, but notes increased pain by the end of her shift.    Motrin has helped with the inflammation , but not with the pain.   She denies any loss of  strength or paraesthesias of either hand.        Objective Findings:  finkelstein's test   negative bilaterally.   + tinels test bilaterally   strength 5/5 bilat.   No thenar wasting bilaterally  There is some minor tenderness over anterior wrists.      Pre-Existing Condition(s):     Assessment:   Condition Worsened    Status: Additional Care Required  Permanent Disability:No    Plan: Medication    Diagnostics:      Comments:       Disability Information   Status: Released to Restricted Duty    From:  11/15/2019  Through: 11/22/2019 Restrictions are: Temporary   Physical Restrictions   Sitting:    Standing:    Stooping:    Bending:      Squatting:    Walking:    Climbing:    Pushing:      Pulling:    Other:    Reaching Above Shoulder (L):   Reaching Above Shoulder (R):       Reaching Below Shoulder (L):    Reaching Below Shoulder (R):     Not to exceed Weight Limits   Carrying(hrs):   Weight Limit(lb): < or = to 10 pounds Lifting(hrs):   Weight  Limit(lb): < or = to 10 pounds   Comments: Wrist tendonitis  Pain is worse  advised to discontinue motrin  Rx mobic 15mg qd  Continue bilat wrist splints  Concern for carpal tunnel - advised f/u occupational med within one week  Tightened work restrictions - see D39    Repetitive Actions   Hands: i.e. Fine Manipulations from Grasping: < or = to 1 hr/day   Feet: i.e. Operating Foot Controls:     Driving / Operate Machinery:     Physician Name: Kenneth Carlson M.D. Physician Signature: KENNETH Joy M.D. e-Signature: Dr. Paul Reina, Medical Director   Clinic Name / Location: [address] [address] Clinic Phone Number: Dept: [phone]   Appointment Time: 5:00 Pm Visit Start Time: 5:21 PM   Check-In Time:  4:39 Pm Visit Discharge Time:  6:02 PM   Original-Treating Physician or Chiropractor    Page 2-Insurer/TPA    Page 3-Employer    Page 4-Employee

## 2019-11-16 NOTE — PROGRESS NOTES
"Chief Complaint   Patient presents with   • Work-Related Injury     [facility] DOI: 10-25-19 Hands, wrists and arms.         HPI:    Here for f/u:     Date of injury: 10/24/2019. Injured at work: no specific trauma; notes onset of left hand/wrist/forearm pain associated with gripping trigger activity.            She states that the rt wrist pain is getting worse.    She feels that the wrist splints are somewhat helpful, but notes increased pain by the end of her shift.    Motrin has helped with the inflammation , but not with the pain.   She denies any loss of  strength or paraesthesias of either hand.       Social History     Tobacco Use   • Smoking status: Former Smoker     Packs/day: 0.00   • Smokeless tobacco: Never Used   Substance Use Topics   • Alcohol use: Yes     Comment: 3-4/week   • Drug use: No             Review of Systems   Constitutional: Negative for fever, chills and malaise/fatigue.   Eyes: Negative for vision changes, d/c.    Respiratory: Negative for cough and sputum production.    Cardiovascular: Negative for chest pain and palpitations.   Gastrointestinal: Negative for nausea, vomiting, abdominal pain, diarrhea and constipation.   Genitourinary: Negative for dysuria, urgency and frequency.   Skin: Negative for rash or  itching.   Neurological: Negative for dizziness and tingling.   Psychiatric/Behavioral: Negative for depression.   Hematologic/lymphatic - denies bruising or excessive bleeding  All other systems reviewed and are negative.        OBJECTIVE  /78   Pulse 95   Temp 37.6 °C (99.6 °F) (Temporal)   Resp 12   Ht 1.499 m (4' 11\")   Wt 45.8 kg (101 lb)   SpO2 99%   HEENT - PERRLA, EOMI  Neuro - alert and oriented x3.       Musculoskeletal -    finkelstein's test   negative bilaterally.   + tinels test bilaterally   strength 5/5 bilat.   No thenar wasting bilaterally  There is some minor tenderness over anterior wrists.       1. Wrist tendonitis  Pain is worse  advised to " discontinue motrin  Rx mobic 15mg qd  Continue bilat wrist splints  Concern for carpal tunnel - advised f/u occupational med within one week  Tightened work restrictions - see D39

## 2019-11-26 ENCOUNTER — PHYSICAL THERAPY (OUTPATIENT)
Dept: PHYSICAL THERAPY | Facility: REHABILITATION | Age: 19
End: 2019-11-26
Attending: NURSE PRACTITIONER
Payer: COMMERCIAL

## 2019-11-26 DIAGNOSIS — M77.8 TENDONITIS OF BOTH WRISTS: ICD-10-CM

## 2019-11-26 PROCEDURE — 97110 THERAPEUTIC EXERCISES: CPT

## 2019-11-26 PROCEDURE — 97161 PT EVAL LOW COMPLEX 20 MIN: CPT

## 2019-11-26 ASSESSMENT — ENCOUNTER SYMPTOMS
QUALITY: SHOOTING
QUALITY: TINGLING
PAIN SCALE: 7
PAIN SCALE AT HIGHEST: 9
QUALITY: SHARP
QUALITY: DULL ACHE

## 2019-11-26 NOTE — OP THERAPY EVALUATION
Outpatient Physical Therapy  INITIAL EVALUATION    [facility] [place]  [address], [address]  [facility] [address]  Phone:  [phone]  Fax:  [phone]    Date of Evaluation: 2019    Patient: Brenda Stephen  YOB: 2000  MRN: [id number]     Referring Provider: BE Garcia  [address] [place] [address]   Referring Diagnosis Tendonitis of both wrists [M77.8]     Time Calculation  Start time: 330  Stop time: 430 Time Calculation (min): 60 minutes       Physical Therapy Occurrence Codes    Date physical therapy care plan established or reviewed:  19   Date physical therapy treatment started:  19          Chief Complaint: bilateral wrist pain    Visit Diagnoses     ICD-10-CM   1. Tendonitis of both wrists M77.8         Subjective:   History of Present Illness:     Mechanism of injury:  Brenda Stephen is a 18 y.o. female that presents to therapy with bilateral. She states that symptoms came on with isidius onset about 1 month ago. Her pain is located along the anterior wrist extending to the elbow. She also reports intermittent   Numbness and tingling along the back of her forearms extending to her elbow. Feeling of stiffness. Pain is worst in the R>L.  Ascending  pain at times to her shoulder.     Aggravating factors: , holding lifting   Relieving factors: rest, heat, ice, topicals    ADL limitations: limited  strength, pain with ADLS.  Pain:     Current pain ratin    At worst pain ratin    Quality:  Dull ache, sharp, tingling and shooting      History reviewed. No pertinent past medical history.  History reviewed. No pertinent surgical history.  Social History     Tobacco Use   • Smoking status: Former Smoker     Packs/day: 0.00   • Smokeless tobacco: Never Used   Substance Use Topics   • Alcohol use: Yes     Comment: 3-4/week     Family and Occupational History     Patient does not qualify to have social determinant  information on file (likely too young).   Socioeconomic History   • Marital status: Single     Spouse name: Not on file   • Number of children: Not on file   • Years of education: Not on file   • Highest education level: Not on file   Occupational History   • Not on file       Objective     Active Range of Motion     Left Wrist   Wrist flexion: 90 degrees   Wrist extension: 70 degrees   Wrist pronation: WFL  Wrist supination: WFL  Radial deviation: 25 degrees   Ulnar deviation: 40 degrees     Right Wrist   Wrist flexion: 90 degrees   Wrist extension: 70 degrees   Wrist pronation: WFL  Wrist supination: WFL  Radial deviation: 22 degrees   Ulnar deviation: 40 degrees     Left Thumb     Normal active range of motion    Right Thumb     Normal active range of motion    Left Digits    Normal active range of motion    Right Digits   Normal active range of motion    Passive Range of Motion     Additional Passive Range of Motion Details  Wrist flexion with elbow straight painful ROM limited on R to 60 degrees with pain compared to 70 degrees without pain on Left.     Strength:      Left Wrist/Hand   Wrist extension: 4+  Wrist flexion: 4  Radial deviation: 5  Ulnar deviation: 5   (2nd hand position)     Trial 1: 55    Right Wrist/Hand   Wrist extension: 4+  Wrist flexion: 4  Radial deviation: 5  Ulnar deviation: 5   (2nd hand position)     Trial 1: 65    Tests     Right Elbow   Positive Cozen's.         Therapeutic Exercises (CPT 12779):       Therapeutic Exercise Summary: HEP instruction/performance and development. Handout provided and exercises located below:  Access Code: [email or web address]   URL: [email or web address]   Date: 11/26/2019   Prepared by: Vinny Pedroza      Exercises  · Wrist Flexion with Resistance - 15 reps - 2 sets - 2x daily  · Wrist Extension with Resistance - 15 reps - 2 sets - 2x daily  · Standing Wrist Flexion Stretch - 2 sets - 20 hold - 4x daily          Time-based  treatments/modalities:  Therapeutic exercise minutes (CPT 40770): 15 minutes       Assessment, Response and Plan:   Assessment details:  Brenda Stephen is a 18 y.o. female with signs and symptoms consistent with with wrist extensor tendonitis. She requires skilled physical therapy intervention to decrease pain, increase strength, increase functional mobility, improve ADL completion and establish a home exercise program.  Goals:   Short Term Goals:   1. Patient will be Independent with prescribed Home Exercise Program (HEP) and will be able to demonstrate exercises without cues for improved overall symptoms/activity tolerance.   2. Pt will improve wrist extensor flexability by 10 degrees on R for decreased symptoms.   Short term goal time span:  2-4 weeks      Long Term Goals:    3. Pt will improve wrist flexion strength to 4+/5 for improved muscular balance and decreased forced used/ compensation of wrist extensors.   4. Pt will improve DASH score to less than 20% indicative of improved function and reduced perceived disability.  5. Pt to return to work full duty without increased pain.   Long term goal time span:  4-6 weeks    Plan:   Planned therapy interventions:  Therapeutic Exercise (CPT 35592), Therapeutic Activities (CPT 57827), Manual Therapy (CPT 64853), Neuromuscular Re-education (CPT 04598) and E Stim Unattended (CPT 02007)  Frequency:  2x week  Duration in weeks:  4  Discussed with:  Patient      Functional Assessment Used  PT Functional Assessment Tool Used: DASH  PT Functional Assessment Score: 40%     Referring provider co-signature:  I have reviewed this plan of care and my co-signature certifies the need for services.  Certification Dates:   From 11/26/19   To 12/31/19    Physician Signature: ________________________________ Date: ______________

## 2019-12-02 ENCOUNTER — PHYSICAL THERAPY (OUTPATIENT)
Dept: PHYSICAL THERAPY | Facility: REHABILITATION | Age: 19
End: 2019-12-02
Attending: NURSE PRACTITIONER
Payer: COMMERCIAL

## 2019-12-02 DIAGNOSIS — M77.8 TENDONITIS OF BOTH WRISTS: ICD-10-CM

## 2019-12-02 PROCEDURE — 97110 THERAPEUTIC EXERCISES: CPT

## 2019-12-02 NOTE — OP THERAPY DAILY TREATMENT
Outpatient Physical Therapy  DAILY TREATMENT     [facility] [place]  [address], [address]  [facility] [address]  Phone:  [phone]  Fax:  [phone]    Date: 12/02/2019    Patient: Brenda Stephen  YOB: 2000  MRN: [id number]     Time Calculation  Start time: 0450  Stop time: 0520 Time Calculation (min): 30 minutes       Chief Complaint: Bilateral wrist pain  Visit #: 2    SUBJECTIVE:  Pt reports pain is doing a little better. Note that the exercises are not that bad.     OBJECTIVE:  Current objective measures: wrist flexion and extension 4+/5 painful wrist flexion R.  strength 55lb R, 45 lbs left.           Therapeutic Exercises (CPT 14066):     1. Ube, lvl 3 x 1.5 min    2. Wrist extension, 3# x 20     3. Wrist flexion, 3# x 20    4. Digiextened, 1min each    5. Versa , 5lb x 30 sec x 3 each    6. Dowel supination/ pronation, x 10 each      Time-based treatments/modalities:  Therapeutic exercise minutes (CPT 14087): 30 minutes       Pain rating before treatment: 0  Pain rating after treatment: 0 at rest, 5/10 with dowel exercise    ASSESSMENT:   Response to treatment: pt tolerated all exercises without increased pain at rest. Strength limited but demonstrating improvement.     PLAN/RECOMMENDATIONS:   Plan for treatment: therapy treatment to continue next visit.  Planned interventions for next visit: continue with current treatment.

## 2019-12-03 ENCOUNTER — OCCUPATIONAL MEDICINE (OUTPATIENT)
Dept: OCCUPATIONAL MEDICINE | Facility: CLINIC | Age: 19
End: 2019-12-03
Payer: COMMERCIAL

## 2019-12-03 VITALS
SYSTOLIC BLOOD PRESSURE: 120 MMHG | HEART RATE: 87 BPM | TEMPERATURE: 98.9 F | DIASTOLIC BLOOD PRESSURE: 64 MMHG | WEIGHT: 102 LBS | HEIGHT: 57 IN | BODY MASS INDEX: 22.01 KG/M2 | OXYGEN SATURATION: 100 %

## 2019-12-03 DIAGNOSIS — M77.8 TENDONITIS OF BOTH WRISTS: ICD-10-CM

## 2019-12-03 PROCEDURE — 99213 OFFICE O/P EST LOW 20 MIN: CPT | Mod: 29 | Performed by: NURSE PRACTITIONER

## 2019-12-03 ASSESSMENT — ENCOUNTER SYMPTOMS
RESPIRATORY NEGATIVE: 1
TINGLING: 1
PSYCHIATRIC NEGATIVE: 1
MYALGIAS: 1
SENSORY CHANGE: 1
CARDIOVASCULAR NEGATIVE: 1
WEAKNESS: 1
CONSTITUTIONAL NEGATIVE: 1

## 2019-12-03 NOTE — PROGRESS NOTES
"Subjective:      Brenda Stephen is a 18 y.o. female who presents with Other (WC DOI 10/24/2019 Rt and Lt Arms, same, rm 16))      Date of injury: 10/24/2019. Injured at work: no specific trauma; notes onset of left hand/wrist/forearm pain associated with gripping trigger activity. Previous injury: Notes onset of right hand/wrist/forearm pain associated with work activity 2 months ago.       Today patient states some mild improvement with wrist pain.  Pain is described as constant tightness, burning that radiates from wrist to fingertips in both hands, dull, and achy pain.  Symptoms are exacerbated with repetitive movements and relieved with rest.  Patient Mobic provides moderate relief.  Patient states that she applies heat with moderate relief of symptoms.  Patient states that she has been weaning wrist braces on both hands, primarily wearing at night.  Patient is tolerating work restrictions.  Patient started hand therapy, feels that it has helped moderately with symptoms. Plan of care discussed with patient.       HPI    Review of Systems   Constitutional: Negative.    Respiratory: Negative.    Cardiovascular: Negative.    Musculoskeletal: Positive for joint pain and myalgias.   Skin: Negative.    Neurological: Positive for tingling, sensory change and weakness.   Psychiatric/Behavioral: Negative.         SOCHX: Works as a  at [employer]  FH: No pertinent family history to this problem.         Objective:     /64   Pulse 87   Temp 37.2 °C (98.9 °F)   Ht 1.448 m (4' 9\")   Wt 46.3 kg (102 lb)   SpO2 100%   BMI 22.07 kg/m²      Physical Exam  Constitutional:       General: She is not in acute distress.     Appearance: Normal appearance. She is not ill-appearing.   Cardiovascular:      Rate and Rhythm: Normal rate and regular rhythm.   Pulmonary:      Effort: Pulmonary effort is normal.   Musculoskeletal: Normal range of motion.         General: Tenderness and signs of injury present. No " swelling or deformity.      Right wrist: She exhibits tenderness and bony tenderness. She exhibits normal range of motion, no swelling and no crepitus.      Left wrist: She exhibits tenderness and bony tenderness. She exhibits normal range of motion, no swelling, no crepitus and no deformity.        Arms:    Skin:     General: Skin is warm and dry.      Capillary Refill: Capillary refill takes less than 2 seconds.      Findings: No bruising or erythema.   Neurological:      General: No focal deficit present.      Mental Status: She is alert and oriented to person, place, and time.      Cranial Nerves: No cranial nerve deficit.      Sensory: Sensory deficit present.      Motor: No weakness.      Coordination: Coordination normal.      Gait: Gait normal.      Deep Tendon Reflexes: Reflexes normal.   Psychiatric:         Mood and Affect: Mood normal.         Behavior: Behavior normal.         Mild tenderness without swelling bilateral distal volar forearms, anterior wrists, proximal hands.  Tendon function deficit, normal joint range of motion.  Vascular: Bilateral radial pulses intact, cap refill intact.  Neurological: Normal bilateral motor function, sensory to light touch grossly intact bilaterally  Tinel's positive  Phalen and Finkelstein negative       Assessment/Plan:       1. Tendonitis of both wrists    - REFERRAL TO NEURODIAGNOSTICS (EEG,EP,EMG/NCS/DBS) Modality Requested: NCS-Comment Extremities (Bilateral wrists)  - diclofenac (SOLARAZE) 3 % gel; Apply 1 Application to affected area(s) 2 times a day.  Dispense: 50 g; Refill: 0    Follow-up in 3 weeks   Work restrictions applied   NCS ordered  Hand therapy appointments as scheduled   Continue with Mobic as prescribed  Apply diclofenac gel to the affected areas as prescribed   Continue with gentle range of motion and stretching as tolerated   Continue with ice and heat as tolerated   Continue with wrist brace as tolerated

## 2019-12-03 NOTE — LETTER
[address],   [address] [place] [address]  Phone:  [phone] - Fax:  [phone]   [facility] Progress Report and Disability Certification  Date of Service: 12/3/2019   No Show:  No  Date / Time of Next Visit: 12/24/2019 @ 11:45 AM   Claim Information   Patient Name: Brenda Stephen  Claim Number:     Employer: [employer]  Date of Injury: 10/25/2019     Insurer / TPA: [employer]  ID / SSN:     Occupation:   Diagnosis: The encounter diagnosis was Tendonitis of both wrists.    Medical Information   Related to Industrial Injury?   Comments:Indeterminant    Subjective Complaints:  Date of injury: 10/24/2019. Injured at work: no specific trauma; notes onset of left hand/wrist/forearm pain associated with gripping trigger activity. Previous injury: Notes onset of right hand/wrist/forearm pain associated with work activity 2 months ago.       Today patient states some mild improvement with wrist pain.  Pain is described as constant tightness, burning that radiates from wrist to fingertips in both hands, dull, and achy pain.  Symptoms are exacerbated with repetitive movements and relieved with rest.  Patient Mobic provides moderate relief.  Patient states that she applies heat with moderate relief of symptoms.  Patient states that she has been weaning wrist braces on both hands, primarily wearing at night.  Patient is tolerating work restrictions.  Patient started hand therapy, feels that it has helped moderately with symptoms. Plan of care discussed with patient.     Objective Findings: Mild tenderness without swelling bilateral distal volar forearms, anterior wrists, proximal hands.  Tendon function deficit, normal joint range of motion.  Vascular: Bilateral radial pulses intact, cap refill intact.  Neurological: Normal bilateral motor function, sensory to light touch grossly intact bilaterally  Tinel's positive  Phalen and  Finkelstein negative   Pre-Existing Condition(s):     Assessment:   Condition Improved    Status: Additional Care Required  Permanent Disability:No    Plan: OTDiagnostics    Diagnostics: EMG / NCS    Comments:  Follow-up in 3 weeks   Work restrictions applied   NCS ordered  Hand therapy appointments as scheduled   Continue with Mobic as prescribed  Apply diclofenac gel to the affected areas as prescribed   Continue with gentle range of motion and stretching   as tolerated   Continue with ice and heat as tolerated   Continue with wrist brace as tolerated     Disability Information   Status: Released to Restricted Duty    From:  12/3/2019  Through: 12/24/2019 Restrictions are: Temporary   Physical Restrictions   Sitting:    Standing:    Stooping:    Bending:      Squatting:    Walking:    Climbing:    Pushing:      Pulling:    Other:    Reaching Above Shoulder (L):   Reaching Above Shoulder (R):       Reaching Below Shoulder (L):    Reaching Below Shoulder (R):      Not to exceed Weight Limits   Carrying(hrs):   Weight Limit(lb): < or = to 10 pounds Lifting(hrs):   Weight  Limit(lb): < or = to 10 pounds   Comments:      Repetitive Actions   Hands: i.e. Fine Manipulations from Grasping: < or = to 2 hrs/day   Feet: i.e. Operating Foot Controls:     Driving / Operate Machinery:     Physician Name: BE Garcia Physician Signature: ELIO Mg e-Signature: Dr. Paul Reina, Medical Director   Clinic Name / Location: [address],   [address] [place] [address] Clinic Phone Number: Dept: [phone]   Appointment Time: 11:30 Am Visit Start Time: 11:32 AM   Check-In Time:  11:22 Am Visit Discharge Time:  12:09 PM    Original-Treating Physician or Chiropractor    Page 2-Insurer/TPA    Page 3-Employer    Page 4-Employee

## 2019-12-16 ENCOUNTER — PHYSICAL THERAPY (OUTPATIENT)
Dept: PHYSICAL THERAPY | Facility: REHABILITATION | Age: 19
End: 2019-12-16
Attending: NURSE PRACTITIONER
Payer: COMMERCIAL

## 2019-12-16 DIAGNOSIS — M77.8 TENDONITIS OF BOTH WRISTS: ICD-10-CM

## 2019-12-16 PROCEDURE — 97110 THERAPEUTIC EXERCISES: CPT

## 2019-12-24 ENCOUNTER — OCCUPATIONAL MEDICINE (OUTPATIENT)
Dept: OCCUPATIONAL MEDICINE | Facility: CLINIC | Age: 19
End: 2019-12-24
Payer: COMMERCIAL

## 2019-12-24 ENCOUNTER — OFFICE VISIT (OUTPATIENT)
Dept: MEDICAL GROUP | Facility: MEDICAL CENTER | Age: 19
End: 2019-12-24
Payer: COMMERCIAL

## 2019-12-24 VITALS
HEIGHT: 57 IN | WEIGHT: 102.07 LBS | BODY MASS INDEX: 22.02 KG/M2 | SYSTOLIC BLOOD PRESSURE: 132 MMHG | HEART RATE: 90 BPM | OXYGEN SATURATION: 98 % | DIASTOLIC BLOOD PRESSURE: 78 MMHG | TEMPERATURE: 100 F | RESPIRATION RATE: 18 BRPM

## 2019-12-24 VITALS
BODY MASS INDEX: 20.36 KG/M2 | OXYGEN SATURATION: 98 % | WEIGHT: 101 LBS | SYSTOLIC BLOOD PRESSURE: 110 MMHG | HEIGHT: 59 IN | HEART RATE: 98 BPM | DIASTOLIC BLOOD PRESSURE: 66 MMHG | TEMPERATURE: 100 F

## 2019-12-24 DIAGNOSIS — M77.8 TENDONITIS OF BOTH WRISTS: ICD-10-CM

## 2019-12-24 DIAGNOSIS — Z00.00 PREVENTATIVE HEALTH CARE: ICD-10-CM

## 2019-12-24 DIAGNOSIS — R59.9 SWOLLEN LYMPH NODES: ICD-10-CM

## 2019-12-24 DIAGNOSIS — R10.13 EPIGASTRIC PAIN: ICD-10-CM

## 2019-12-24 PROCEDURE — 99214 OFFICE O/P EST MOD 30 MIN: CPT | Performed by: PHYSICIAN ASSISTANT

## 2019-12-24 PROCEDURE — 99213 OFFICE O/P EST LOW 20 MIN: CPT | Mod: 29 | Performed by: NURSE PRACTITIONER

## 2019-12-24 RX ORDER — PANTOPRAZOLE SODIUM 20 MG/1
20 TABLET, DELAYED RELEASE ORAL DAILY
Qty: 30 TAB | Refills: 1 | Status: SHIPPED
Start: 2019-12-24 | End: 2020-02-23

## 2019-12-24 SDOH — HEALTH STABILITY: MENTAL HEALTH: HOW OFTEN DO YOU HAVE 6 OR MORE DRINKS ON ONE OCCASION?: NEVER

## 2019-12-24 SDOH — HEALTH STABILITY: MENTAL HEALTH: HOW OFTEN DO YOU HAVE A DRINK CONTAINING ALCOHOL?: 2-3 TIMES A WEEK

## 2019-12-24 SDOH — HEALTH STABILITY: MENTAL HEALTH: HOW MANY STANDARD DRINKS CONTAINING ALCOHOL DO YOU HAVE ON A TYPICAL DAY?: 1 OR 2

## 2019-12-24 ASSESSMENT — ENCOUNTER SYMPTOMS
TINGLING: 1
SENSORY CHANGE: 1
RESPIRATORY NEGATIVE: 1
CARDIOVASCULAR NEGATIVE: 1
WEAKNESS: 1
MYALGIAS: 1
PSYCHIATRIC NEGATIVE: 1
CONSTITUTIONAL NEGATIVE: 1

## 2019-12-24 ASSESSMENT — PATIENT HEALTH QUESTIONNAIRE - PHQ9: CLINICAL INTERPRETATION OF PHQ2 SCORE: 0

## 2019-12-24 NOTE — LETTER
[address],   [address] [place] [address]  Phone:  [phone] - Fax:  [phone]   [facility] Progress Report and Disability Certification  Date of Service: 12/24/2019   No Show:  No  Date / Time of Next Visit: 1/14/2020 @ 11:30am   Claim Information   Patient Name: Brenda Stephen  Claim Number:     Employer: [employer]  Date of Injury: 10/25/2019     Insurer / TPA: [employer]  ID / SSN:     Occupation:   Diagnosis: The encounter diagnosis was Tendonitis of both wrists.    Medical Information   Related to Industrial Injury?   Comments:Indeterminate    Subjective Complaints:  Date of injury: 10/24/2019. Injured at work: no specific trauma; notes onset of left hand/wrist/forearm pain associated with gripping trigger activity. Previous injury: Notes onset of right hand/wrist/forearm pain associated with work activity 2 months ago.       Today patient states some moderate improvement with wrist pain.  Pain is described as constant tightness, tingling that radiates from wrist to fingertips in both hands, dull, and achy pain.  Symptoms are exacerbated with repetitive movements and relieved with rest.  Patient Mobic provides moderate relief.  Patient states that she applies heat with moderate relief of symptoms.  Patient states that she has been weaning wrist braces on both hands, primarily wearing at night.  Patient is tolerating work restrictions.  Patient feels that hand therapy has moderately improved symptoms.  Patient states that her EMG is scheduled for 12/30/19 plan of care discussed with patient.      Objective Findings: Mild tenderness without swelling bilateral distal volar forearms, anterior wrists, proximal hands.  Tendon function deficit, normal joint range of motion.  Vascular: Bilateral radial pulses intact, cap refill intact.  Neurological: Normal bilateral motor function, sensory to light touch grossly  intact bilaterally  Tinel's positive  Phalen and Finkelstein negative   Pre-Existing Condition(s):     Assessment:   Condition Improved    Status: Additional Care Required  Permanent Disability:No    Plan: OTDiagnostics    Diagnostics: EMG / NCS    Comments:  Follow-up in 3 weeks   Work restrictions applied   EMG appointment as scheduled 12/30/2019  Hand therapy appointments as scheduled   Continue with Mobic as prescribed   Apply diclofenac gel to the affected areas as prescribed   Continue with gentle r  mike of motion and stretching as tolerated   Continue with ice and heat as tolerated   Continue with wrist brace as tolerated       Disability Information   Status: Released to Restricted Duty    From:  12/24/2019  Through: 1/14/2020 Restrictions are: Temporary   Physical Restrictions   Sitting:    Standing:    Stooping:    Bending:      Squatting:    Walking:    Climbing:    Pushing:      Pulling:    Other:    Reaching Above Shoulder (L):   Reaching Above Shoulder (R):       Reaching Below Shoulder (L):    Reaching Below Shoulder (R):      Not to exceed Weight Limits   Carrying(hrs):   Weight Limit(lb): < or = to 10 pounds Lifting(hrs):   Weight  Limit(lb): < or = to 10 pounds   Comments:      Repetitive Actions   Hands: i.e. Fine Manipulations from Grasping: < or = to 2 hrs/day   Feet: i.e. Operating Foot Controls:     Driving / Operate Machinery:     Physician Name: BE Garcia Physician Signature:   e-Signature: Dr. Paul Reina, Medical Director   Clinic Name / Location: [address],   [address] [place] [address] Clinic Phone Number: Dept: [phone]   Appointment Time: 11:45 Am Visit Start Time: 11:43 AM   Check-In Time:  11:39 Am Visit Discharge Time:  12:11pm   Original-Treating Physician or Chiropractor    Page 2-Insurer/TPA    Page 3-Employer    Page 4-Employee

## 2019-12-24 NOTE — PROGRESS NOTES
"Subjective:   CC:   Chief Complaint   Patient presents with   • Establish Care   • Requesting Labs       Brenda Stephen is a 19 y.o. female here today for establishing care.  HPI:  Patient states she has been having swollen lymph nodes on the right side of the neck on and off for 6 months.  Lymph nodes get swollen and painful over the right side of the neck, no fevers or chills, no change in weight or appetite.    Patient complains of abdominal pain over epigastric area for the past 6 months.  Pain feels sharp like stabbing, gets nausea without vomiting with it.  States she has noticed fatty and spicy food makes it worse.  Also sometimes taking medicine such as ibuprofen makes it worse.  Has not tried any medicine for it.  No blood in his stool.        Current medicines (including changes today)  Current Outpatient Medications   Medication Sig Dispense Refill   • pantoprazole (PROTONIX) 20 MG tablet Take 1 Tab by mouth every day. 30 Tab 1   • diclofenac (SOLARAZE) 3 % gel Apply 1 Application to affected area(s) 2 times a day. 50 g 0   • oseltamivir (TAMIFLU) 75 MG Cap Take 1 Cap by mouth 2 times a day. (Patient not taking: Reported on 12/3/2019) 10 Cap 0     No current facility-administered medications for this visit.          Past medical, surgical, family, and social history are reviewed in Epic chart by me today.   Medications and allergies reviewed in Epic chart by me today.         ROS   No chest pain, no shortness of breath, no abdominal pain  As documented in history of present illness above     Objective:     /78 (BP Location: Right arm, Patient Position: Sitting, BP Cuff Size: Adult)   Pulse 90   Temp 37.8 °C (100 °F)   Resp 18   Ht 1.448 m (4' 9\")   Wt 46.3 kg (102 lb 1.2 oz)   SpO2 98%  Body mass index is 22.09 kg/m².   Physical Exam:  Constitutional: Alert, oriented in no acute distress.  Psych: Eye contact is good, speech goal directed, affect calm  Eyes: Conjunctiva non-injected, sclera " non-icteric.  ENMT: Ears:Pinna normal. TM pearly gray.               Lips without lesions, Clear oropharynx, mucous membranes pink and moist.  Neck: No cervical or supraclavicular lymphadenopathy,Trachea midline, no thyromegaly, no masses, no mass or lumps noted today  Lungs: Unlabored respiratory effort, clear to auscultation bilaterally with good excursion, no wheez or rhonci  CV: regular rate and rhythm. No lower extremity edema  Abdomen: soft, nontender, No CVAT  Skin: no lesions in visible areas.  Ext: no edema, color normal, vascularity normal, temperature normal        Assessment and Plan:   The following treatment plan was discussed      1. Preventative health care    - CBC WITH DIFFERENTIAL; Future  - Comp Metabolic Panel; Future  - Lipid Profile; Future  - TSH WITH REFLEX TO FT4; Future  - VITAMIN D,25 HYDROXY; Future    2. Epigastric pain  We are going to try PPI for 4 to 6 weeks.  Symptoms continues discussed H. pylori stool antigen, possible liver ultrasound  - pantoprazole (PROTONIX) 20 MG tablet; Take 1 Tab by mouth every day.  Dispense: 30 Tab; Refill: 1    3. Swollen lymph nodes  No swollen lymph nodes palpated today.  Advised patient if symptoms continues after taking CBC we can order an ultrasound of the neck        Followup: Return in about 4 weeks (around 1/21/2020).         Please note that this dictation was created using voice recognition software. I have made every reasonable attempt to correct obvious errors, but I expect that there are errors of grammar and possibly content that I did not discover before finalizing the note.

## 2019-12-24 NOTE — PROGRESS NOTES
"Subjective:      Brenda Stephen is a 19 y.o. female who presents with Other (WC DOI 0/24/2019 Rt and Lt Arms, better, rm 17)      Date of injury: 10/24/2019. Injured at work: no specific trauma; notes onset of left hand/wrist/forearm pain associated with gripping trigger activity. Previous injury: Notes onset of right hand/wrist/forearm pain associated with work activity 2 months ago.       Today patient states some moderate improvement with wrist pain.  Pain is described as constant tightness, tingling that radiates from wrist to fingertips in both hands, dull, and achy pain.  Symptoms are exacerbated with repetitive movements and relieved with rest.  Patient Mobic provides moderate relief.  Patient states that she applies heat with moderate relief of symptoms.  Patient states that she has been weaning wrist braces on both hands, primarily wearing at night.  Patient is tolerating work restrictions.  Patient feels that hand therapy has moderately improved symptoms.  Patient states that her EMG is scheduled for 12/30/19 plan of care discussed with patient.        HPI    Review of Systems   Constitutional: Negative.    Respiratory: Negative.    Cardiovascular: Negative.    Musculoskeletal: Positive for joint pain and myalgias.   Skin: Negative.    Neurological: Positive for tingling, sensory change and weakness.   Psychiatric/Behavioral: Negative.         SOCHX: Works as a [employer] at   FH: No pertinent family history to this problem.         Objective:     /66   Pulse 98   Temp 37.8 °C (100 °F)   Ht 1.499 m (4' 11\")   Wt 45.8 kg (101 lb)   LMP 12/01/2019   SpO2 98%   BMI 20.40 kg/m²      Physical Exam  Constitutional:       General: She is not in acute distress.     Appearance: Normal appearance. She is not ill-appearing.   Cardiovascular:      Rate and Rhythm: Normal rate and regular rhythm.      Pulses: Normal pulses.   Pulmonary:      Effort: Pulmonary effort is normal. "   Musculoskeletal: Normal range of motion.         General: Tenderness and signs of injury present. No swelling or deformity.   Skin:     General: Skin is warm and dry.      Capillary Refill: Capillary refill takes less than 2 seconds.      Findings: No bruising or erythema.   Neurological:      General: No focal deficit present.      Mental Status: She is alert and oriented to person, place, and time.      Cranial Nerves: No cranial nerve deficit.      Sensory: No sensory deficit.      Motor: No weakness.      Gait: Gait normal.   Psychiatric:         Mood and Affect: Mood normal.         Behavior: Behavior normal.         Mild tenderness without swelling bilateral distal volar forearms, anterior wrists, proximal hands.  Tendon function deficit, normal joint range of motion.  Vascular: Bilateral radial pulses intact, cap refill intact.  Neurological: Normal bilateral motor function, sensory to light touch grossly intact bilaterally  Tinel's positive  Phalen and Finkelstein negative       Assessment/Plan:       1. Tendonitis of both wrists      Follow-up in 3 weeks   Work restrictions applied   EMG appointment as scheduled 12/30/2019   Hand therapy appointments as scheduled   Continue with Mobic as prescribed   Apply diclofenac gel to the affected areas as prescribed   Continue with gentle range of motion and stretching as tolerated   Continue with ice and heat as tolerated   Continue with wrist brace as tolerated

## 2019-12-30 ENCOUNTER — PHYSICAL THERAPY (OUTPATIENT)
Dept: PHYSICAL THERAPY | Facility: REHABILITATION | Age: 19
End: 2019-12-30
Attending: NURSE PRACTITIONER
Payer: COMMERCIAL

## 2019-12-30 DIAGNOSIS — M77.8 TENDONITIS OF BOTH WRISTS: ICD-10-CM

## 2019-12-30 PROCEDURE — 97110 THERAPEUTIC EXERCISES: CPT

## 2019-12-31 NOTE — OP THERAPY DAILY TREATMENT
Outpatient Physical Therapy  DAILY TREATMENT     [facility] Outpatient Physical [facility] [place]  [address], [address]  [facility] [place] [address]  Phone:  [phone]  Fax:  [phone]    Date: 12/30/2019    Patient: Brenda Stephen  YOB: 2000  MRN: [id number]     Time Calculation  Start time: 1700  Stop time: 1730 Time Calculation (min): 30 minutes       Chief Complaint: Bilateral wrist pain  Visit #: 4    SUBJECTIVE:  The pain is no longer like a jolt. Its more achey and is only when using the R hand for harder tasks. Pt is fearful that she needs carpal tunnel surgery.      OBJECTIVE:  Current objective measures: wrist flexion and extension 4+/5 painful wrist flexion R.  strength 50lb R, 55 lbs left.           Therapeutic Exercises (CPT 73298):     1. Ube, lvl 4, 3min    2. Wrist extension, 4# x 20 eccentric only     3. Wrist flexion, 4# x 20    4. Digiextened, 1min each    5. Versa , 5lb x 30 sec x 3 each    6. Dowel supination/ pronation, x 10 each      Time-based treatments/modalities:  Therapeutic exercise minutes (CPT 16994): 30 minutes       Pain rating before treatment: 0  Pain rating after treatment: 0 at rest, 5/10 with dowel exercise    ASSESSMENT:   Response to treatment: numbness and tingling not consistent with CTS. More consistent with ulnar neuropathy based on patients reports. Overall less strength noted with  testing but measurements were taken after all exercises were completed.     PLAN/RECOMMENDATIONS:   Plan for treatment: therapy treatment to continue next visit.  Planned interventions for next visit: continue with current treatment.

## 2020-01-06 ENCOUNTER — PHYSICAL THERAPY (OUTPATIENT)
Dept: PHYSICAL THERAPY | Facility: REHABILITATION | Age: 20
End: 2020-01-06
Attending: NURSE PRACTITIONER
Payer: COMMERCIAL

## 2020-01-06 DIAGNOSIS — M77.8 TENDONITIS OF BOTH WRISTS: ICD-10-CM

## 2020-01-06 PROCEDURE — 97110 THERAPEUTIC EXERCISES: CPT

## 2020-01-07 NOTE — OP THERAPY DAILY TREATMENT
Outpatient Physical Therapy  DAILY TREATMENT     [facility] Out[facility] [place]  [address], [address]  [place] [address]  Phone:  [phone]  Fax:  [phone]    Date: 01/06/2020    Patient: Brenda Stephen  YOB: 2000  MRN: [id number]     Time Calculation  Start time: 1700  Stop time: 1730 Time Calculation (min): 30 minutes       Chief Complaint: Bilateral wrist pain  Visit #: 5    SUBJECTIVE:  Notes that pain is at most a 3/10 with work tasks, 2/10 with ADLS. States no pain currently and no numbness or tingling.      OBJECTIVE:  Current objective measures: wrist flexion and extension 4+/5 .  strength 70lb R, 65 lbs left.           Therapeutic Exercises (CPT 73462):     1. Ube, lvl 4, 3min    2. Wrist extension, 4# x 20 eccentric only 10 reps , each    3. Wrist flexion, 5# x 20 each    4. Digiextened, 1min each    5. Versa , 7lb x 30 sec x 3 each    6. Dowel supination/ pronation, x 10 each      Time-based treatments/modalities:  Therapeutic exercise minutes (CPT 51099): 30 minutes       Pain rating before treatment: 0  Pain rating after treatment: 0 at rest, 1/10 with dowel exercise    ASSESSMENT:   Response to treatment: HEP to continue.  strength improved. Overall exercise/ load intensity tolerance improved as well. Will continue to progress loading as able.      PLAN/RECOMMENDATIONS:   Plan for treatment: therapy treatment to continue next visit.  Planned interventions for next visit: continue with current treatment.

## 2020-01-13 ENCOUNTER — PHYSICAL THERAPY (OUTPATIENT)
Dept: PHYSICAL THERAPY | Facility: REHABILITATION | Age: 20
End: 2020-01-13
Attending: NURSE PRACTITIONER
Payer: COMMERCIAL

## 2020-01-13 DIAGNOSIS — M77.8 TENDONITIS OF BOTH WRISTS: ICD-10-CM

## 2020-01-13 PROCEDURE — 97110 THERAPEUTIC EXERCISES: CPT

## 2020-01-13 NOTE — OP THERAPY DAILY TREATMENT
Outpatient Physical Therapy  DAILY TREATMENT     [facility] Outpatient Physical Therapy [place]  [address], [address]  [facility] [address]  Phone:  [phone]  Fax:  [phone]    Date: 01/13/2020    Patient: Brenda Stephen  YOB: 2000  MRN: [id number]     Time Calculation  Start time: 0400  Stop time: 0430 Time Calculation (min): 30 minutes       Chief Complaint: Bilateral wrist pain  Visit #: 6    SUBJECTIVE: Change in work placement, now working with more fine motor>strength. Notes that she has no pain currently.     OBJECTIVE:  Current objective measures: wrist flexion and extension 4+/5 .  strength 70lb R, 65 lbs left.           Therapeutic Exercises (CPT 53766):     1. Ube, lvl 4, 3min    2. Wrist extension, 4# x 20 eccentric only 10 reps , each    3. Wrist flexion, 5# x 20 each    4. Digiextened, 1min each    5. Versa , 7lb x 30 sec x 3 each    6. Dowel supination/ pronation, x 10 each      Time-based treatments/modalities:  Therapeutic exercise minutes (CPT 09463): 30 minutes       Pain rating before treatment: 0  Pain rating after treatment: 0 at rest, 0/10 with dowel exercise    ASSESSMENT:   Response to treatment:Strength maintained since last visit. Overall subjective continues to improve. Symptoms controlled. Discharge appropriate within 2 visits. Will continue to progress loading as able.      PLAN/RECOMMENDATIONS:   Plan for treatment: therapy treatment to continue next visit.  Planned interventions for next visit: continue with current treatment.

## 2020-01-14 ENCOUNTER — OCCUPATIONAL MEDICINE (OUTPATIENT)
Dept: OCCUPATIONAL MEDICINE | Facility: CLINIC | Age: 20
End: 2020-01-14
Payer: COMMERCIAL

## 2020-01-14 VITALS
HEART RATE: 96 BPM | HEIGHT: 59 IN | DIASTOLIC BLOOD PRESSURE: 60 MMHG | OXYGEN SATURATION: 99 % | TEMPERATURE: 99 F | WEIGHT: 101 LBS | SYSTOLIC BLOOD PRESSURE: 102 MMHG | RESPIRATION RATE: 14 BRPM | BODY MASS INDEX: 20.36 KG/M2

## 2020-01-14 DIAGNOSIS — M77.8 TENDONITIS OF BOTH WRISTS: ICD-10-CM

## 2020-01-14 PROCEDURE — 99213 OFFICE O/P EST LOW 20 MIN: CPT | Performed by: NURSE PRACTITIONER

## 2020-01-14 ASSESSMENT — ENCOUNTER SYMPTOMS
RESPIRATORY NEGATIVE: 1
MYALGIAS: 1
CARDIOVASCULAR NEGATIVE: 1
PSYCHIATRIC NEGATIVE: 1
WEAKNESS: 0
SENSORY CHANGE: 0
CONSTITUTIONAL NEGATIVE: 1
TINGLING: 1

## 2020-01-14 NOTE — LETTER
[address],   [address] [place] [address]  Phone:  [phone] - Fax:  [phone]   [facility] Progress Report and Disability Certification  Date of Service: 1/14/2020   No Show:  No  Date / Time of Next Visit: 11/28/19 @ 11:30am   Claim Information   Patient Name: Brenda Stephen  Claim Number:     Employer: [employer]  Date of Injury: 10/25/2019     Insurer / TPA: [employer]  ID / SSN:     Occupation:   Diagnosis: The encounter diagnosis was Tendonitis of both wrists.    Medical Information   Related to Industrial Injury?   Comments:Indeterminant    Subjective Complaints:  Date of injury: 10/24/2019. Injured at work: no specific trauma; notes onset of left hand/wrist/forearm pain associated with gripping trigger activity. Previous injury: Notes onset of right hand/wrist/forearm pain associated with work activity 2 months ago.       Today patient states some moderate improvement with wrist pain.  Pain is described as intermitttent tightness, tingling that radiates from wrist to fingertips in both hands, dull, and achy pain.  Symptoms are exacerbated with repetitive movements and relieved with rest.  Patient states Mobic provides moderate relief.  Patient states that she applies heat with moderate relief of symptoms.  Patient states that she has been weaning wrist braces on both hands, primarily wearing at night.  Patient is tolerating work restrictions.  Patient feels that hand therapy has moderately improved symptoms.  Reviewed EMG results with patient. Plan of care discussed with patient.       Objective Findings: Bilateral wrist:  No tenderness without swelling bilateral distal volar forearms, anterior wrists, proximal hands.  Tendon function deficit, normal joint range of motion.  Vascular: Bilateral radial pulses intact, cap refill intact.  Neurological: Normal bilateral motor function, sensory to light touch  grossly intact bilaterally   Tinel's positive   Phalen and Finkelstein negative     EMG results 12/30/19:  1.  Normal study.  2.  Electrophysiologic evidence of left or right cervical radiculopathy, brachial plexus apathy or any obvious focal entrapment neuropathy.   Pre-Existing Condition(s):     Assessment:   Condition Improved    Status: Additional Care Required  Permanent Disability:No    Plan: OT    Diagnostics:      Comments:  Follow-up in 2 weeks   Work restrictions applied   Hand therapy appointments as scheduled   Continue with Mobic as prescribed   Apply diclofenac gel to the affected areas as prescribed   Continue with gentle range of motion and stretching as tolerate  d   Continue with ice and heat as tolerated   Wean wrist brace as tolerated        Disability Information   Status: Released to Restricted Duty    From:  1/14/2020  Through: 1/29/2020 Restrictions are: Temporary   Physical Restrictions   Sitting:    Standing:    Stooping:    Bending:      Squatting:    Walking:    Climbing:    Pushing:      Pulling:    Other:    Reaching Above Shoulder (L):   Reaching Above Shoulder (R):       Reaching Below Shoulder (L):    Reaching Below Shoulder (R):      Not to exceed Weight Limits   Carrying(hrs):   Weight Limit(lb): < or = to 10 pounds Lifting(hrs):   Weight  Limit(lb): < or = to 10 pounds   Comments:      Repetitive Actions   Hands: i.e. Fine Manipulations from Grasping: < or = to 2 hrs/day   Feet: i.e. Operating Foot Controls:     Driving / Operate Machinery:     Physician Name: BE Garcia Physician Signature:   e-Signature: Dr. Paul Reina, Medical Director   Clinic Name / Location: [address],   [address] [place] [address] Clinic Phone Number: Dept: [phone]   Appointment Time: 11:30 Am Visit Start Time: 11:28 AM   Check-In Time:  11:16 Am Visit Discharge Time:  11:47am   Original-Treating Physician or Chiropractor    Page 2-Insurer/TPA     Page 3-Employer    Page 4-Employee

## 2020-01-14 NOTE — PROGRESS NOTES
"Subjective:      Brenda Stephen is a 19 y.o. female who presents with Follow-Up (WC DOI 10/24/2019 Rt and Lt Arms, better, rm 19)      Date of injury: 10/24/2019. Injured at work: no specific trauma; notes onset of left hand/wrist/forearm pain associated with gripping trigger activity. Previous injury: Notes onset of right hand/wrist/forearm pain associated with work activity 2 months ago.       Today patient states some moderate improvement with wrist pain.  Pain is described as intermitttent tightness, tingling that radiates from wrist to fingertips in both hands, dull, and achy pain.  Symptoms are exacerbated with repetitive movements and relieved with rest.  Patient states Mobic provides moderate relief.  Patient states that she applies heat with moderate relief of symptoms.  Patient states that she has been weaning wrist braces on both hands, primarily wearing at night.  Patient is tolerating work restrictions.  Patient feels that hand therapy has moderately improved symptoms.  Reviewed EMG results with patient. Plan of care discussed with patient.         HPI    Review of Systems   Constitutional: Negative.    Respiratory: Negative.    Cardiovascular: Negative.    Musculoskeletal: Positive for joint pain and myalgias.   Skin: Negative.    Neurological: Positive for tingling. Negative for sensory change and weakness.   Psychiatric/Behavioral: Negative.         SOCHX: Works as a  at [employer]  FH: No pertinent family history to this problem.         Objective:     /60   Pulse 96   Temp 37.2 °C (99 °F)   Resp 14   Ht 1.499 m (4' 11\")   Wt 45.8 kg (101 lb)   SpO2 99%   BMI 20.40 kg/m²      Physical Exam  Constitutional:       General: She is not in acute distress.     Appearance: Normal appearance. She is not ill-appearing.   Cardiovascular:      Rate and Rhythm: Normal rate and regular rhythm.   Pulmonary:      Effort: Pulmonary effort is normal.   Musculoskeletal: Normal range of " motion.         General: Tenderness and signs of injury present. No swelling or deformity.   Skin:     General: Skin is warm and dry.      Capillary Refill: Capillary refill takes less than 2 seconds.      Findings: No bruising or erythema.   Neurological:      General: No focal deficit present.      Mental Status: She is alert and oriented to person, place, and time.      Cranial Nerves: No cranial nerve deficit.      Sensory: No sensory deficit.      Motor: No weakness.      Coordination: Coordination normal.      Gait: Gait normal.   Psychiatric:         Mood and Affect: Mood normal.         Behavior: Behavior normal.         Bilateral wrist:  No tenderness without swelling bilateral distal volar forearms, anterior wrists, proximal hands.  Tendon function deficit, normal joint range of motion.  Vascular: Bilateral radial pulses intact, cap refill intact.  Neurological: Normal bilateral motor function, sensory to light touch grossly intact bilaterally   Tinel's positive   Phalen and Finkelstein negative     EMG results 12/30/19:  1.  Normal study.  2.  Electrophysiologic evidence of left or right cervical radiculopathy, brachial plexus apathy or any obvious focal entrapment neuropathy.       Assessment/Plan:       1. Tendonitis of both wrists      Follow-up in 2 weeks   Work restrictions applied   Hand therapy appointments as scheduled   Continue with Mobic as prescribed   Apply diclofenac gel to the affected areas as prescribed   Continue with gentle range of motion and stretching as tolerated   Continue with ice and heat as tolerated   Wean wrist brace as tolerated

## 2020-01-20 ENCOUNTER — PHYSICAL THERAPY (OUTPATIENT)
Dept: PHYSICAL THERAPY | Facility: REHABILITATION | Age: 20
End: 2020-01-20
Attending: NURSE PRACTITIONER
Payer: COMMERCIAL

## 2020-01-20 DIAGNOSIS — M77.8 TENDONITIS OF BOTH WRISTS: ICD-10-CM

## 2020-01-20 PROCEDURE — 97110 THERAPEUTIC EXERCISES: CPT

## 2020-01-20 NOTE — OP THERAPY DAILY TREATMENT
Outpatient Physical Therapy  DAILY TREATMENT     [facility] [place]  [address], [address]  [facility] [address]  Phone:  [phone]  Fax:  [phone]    Date: 01/20/2020    Patient: Brenda Stephen  YOB: 2000  MRN: [id number]     Time Calculation  Start time: 0805  Stop time: 0830 Time Calculation (min): 25 minutes       Chief Complaint: Bilateral wrist pain  Visit #: 7    SUBJECTIVE: Pt reports that the pain she has is only at the end of her shift. No numbness or tingling reported.     OBJECTIVE:  Current objective measures: wrist flexion and extension 5/5 .  strength 70lb R, 65 lbs left.           Therapeutic Exercises (CPT 45115):     1. Ube, lvl 4, 3min    2. Wrist extension, 5# x 20  , each    3. Wrist flexion, 5# x 20 each    4. Digiextened, 1min each    5. Versa , 7lb x 30 sec x 3 each    6. Dowel supination/ pronation, x 10 each      Time-based treatments/modalities:  Therapeutic exercise minutes (CPT 77361): 25 minutes       Pain rating before treatment: 0  Pain rating after treatment: 0     ASSESSMENT:   Response to treatment: no pain with movement or strength exercises observed or reported. Symptoms during last doctors visit seem greater than that observed in clinic. D/C at next visit.     PLAN/RECOMMENDATIONS:   Plan for treatment: therapy treatment to continue next visit.  Planned interventions for next visit: continue with current treatment.

## 2020-01-21 ENCOUNTER — OFFICE VISIT (OUTPATIENT)
Dept: MEDICAL GROUP | Facility: MEDICAL CENTER | Age: 20
End: 2020-01-21
Payer: COMMERCIAL

## 2020-01-21 VITALS
SYSTOLIC BLOOD PRESSURE: 110 MMHG | OXYGEN SATURATION: 100 % | HEIGHT: 59 IN | WEIGHT: 101.41 LBS | RESPIRATION RATE: 18 BRPM | BODY MASS INDEX: 20.44 KG/M2 | TEMPERATURE: 97.5 F | HEART RATE: 92 BPM | DIASTOLIC BLOOD PRESSURE: 70 MMHG

## 2020-01-21 DIAGNOSIS — M77.8 TENDONITIS OF BOTH WRISTS: ICD-10-CM

## 2020-01-21 DIAGNOSIS — E55.9 VITAMIN D INSUFFICIENCY: ICD-10-CM

## 2020-01-21 PROCEDURE — 99213 OFFICE O/P EST LOW 20 MIN: CPT | Performed by: PHYSICIAN ASSISTANT

## 2020-01-21 ASSESSMENT — PATIENT HEALTH QUESTIONNAIRE - PHQ9: CLINICAL INTERPRETATION OF PHQ2 SCORE: 0

## 2020-01-21 NOTE — PROGRESS NOTES
"Chief Complaint   Patient presents with   • Results   • Tendonitis     both wrist       HPI  Brenda Stephen is a 19 y.o. female here today for lab follow-up.  Patient's vitamin D level is low, currently not taking vitamin days.  States she has had bilateral wrist pain and saw occupational therapist.  She was diagnosed with bilateral tendinitis, prescribed Voltaren gel and referral therapy.  States her symptoms have improved.      Past medical, surgical, family, and social history is reviewed in Epic chart by me today.   Medications and allergies reviewed and updated in Epic chart by me today.     ROS:   As documented in history of present illness above    Exam:  /70 (BP Location: Right arm, Patient Position: Sitting, BP Cuff Size: Adult)   Pulse 92   Temp 36.4 °C (97.5 °F) (Temporal)   Resp 18   Ht 1.499 m (4' 11\")   Wt 46 kg (101 lb 6.6 oz)   SpO2 100%   Constitutional: Alert, no distress, plus 3 vital signs  Skin:  Warm, dry, no rashes invisible areas  Eye: Equal, round and reactive, conjunctiva clear  Psych: Alert, pleasant, well-groomed, normal affect      Last lab results were reviewed by me today and discussed w patient.    A/P:  1. Tendonitis of both wrists  IMPROVED      2. Vitamin D insufficiency  Vitamin D 1000 IU daily.    Unfortunately lab has only processed TSH and vitamin D, patient was advised to do the rest of the blood work    "

## 2020-01-27 ENCOUNTER — PHYSICAL THERAPY (OUTPATIENT)
Dept: PHYSICAL THERAPY | Facility: REHABILITATION | Age: 20
End: 2020-01-27
Attending: NURSE PRACTITIONER
Payer: COMMERCIAL

## 2020-01-27 DIAGNOSIS — M77.8 TENDONITIS OF BOTH WRISTS: ICD-10-CM

## 2020-01-27 PROCEDURE — 97110 THERAPEUTIC EXERCISES: CPT

## 2020-01-27 NOTE — OP THERAPY DAILY TREATMENT
Outpatient Physical Therapy  DAILY TREATMENT     [facility] [place]  [address], [address]  [facility] [address]  Phone:  [phone]  Fax:  [phone]    Date: 01/27/2020    Patient: Brenda Stephen  YOB: 2000  MRN: [id number]     Time Calculation  Start time: 0805  Stop time: 0828 Time Calculation (min): 23 minutes       Chief Complaint: Bilateral wrist pain  Visit #: 8    SUBJECTIVE: It was sore the last two days at work but feels fine today. No numbness or tingling reported.     OBJECTIVE:  Current objective measures: wrist flexion and extension 5/5 .  strength 70lb R, 65 lbs left.   PT Functional Assessment Tool Used: DASH  PT Functional Assessment Score: 10.83%       Therapeutic Exercises (CPT 34823):     1. Ube, lvl 4, 3min    2. Wrist extension, 5# x 20  , each    3. Wrist flexion, 5# x 20 each    4. Digiextened, 1min each    5. Versa , 7lb x 30 sec x 3 each    6. Dowel supination/ pronation, x 10 each      Time-based treatments/modalities:  Therapeutic exercise minutes (CPT 15619): 23 minutes       Pain rating before treatment: 0  Pain rating after treatment: 0     ASSESSMENT:   Response to treatment:D/C appropriate. Pain not produced with exercises in clinic and strength has progressed to WNL. GOALS met.     PLAN/RECOMMENDATIONS:   Plan for treatment: discharge patient due to accomplished goals.

## 2020-01-27 NOTE — OP THERAPY DISCHARGE SUMMARY
Outpatient Physical Therapy  DISCHARGE SUMMARY NOTE      [facility] [place]  [address], [address]  [facility] [place] [address]  Phone:  [phone]  Fax:  [phone]    Date of Visit: 01/27/2020    Patient: Brenda Stephen  YOB: 2000  MRN: [id number]     Referring Provider: BE Garcia  [address] [place] [id number]   Referring Diagnosis Other enthesopathies, not elsewhere classified [M77.8]     Physical Therapy Occurrence Codes    Date physical therapy care plan established or reviewed:  11/26/19   Date physical therapy treatment started:  11/26/19          Functional Assessment Used  PT Functional Assessment Tool Used: DASH  PT Functional Assessment Score: 10.83%     Your patient is being discharged from Physical Therapy with the following comments:   · Goals met    Comments:  Brenda Stephen has completed 8 physical therapy sessions on her current prescription. She has improved function, decreased pain, improved  strength, increased ROM, she continues to progress with her home exercise program. Recommend to discharge patient to full independent home exercise program at this time. Thank you for the opportunity to assist you and your patient.       Limitations Remaining:  None, pain with work reported    Recommendations:  Continue HEP, f/u with physician as needed    Vinny Pedroza, PT, DPT    Date: 1/27/2020

## 2020-01-28 ENCOUNTER — OCCUPATIONAL MEDICINE (OUTPATIENT)
Dept: OCCUPATIONAL MEDICINE | Facility: CLINIC | Age: 20
End: 2020-01-28
Payer: COMMERCIAL

## 2020-01-28 VITALS
RESPIRATION RATE: 14 BRPM | SYSTOLIC BLOOD PRESSURE: 114 MMHG | OXYGEN SATURATION: 97 % | DIASTOLIC BLOOD PRESSURE: 60 MMHG | HEART RATE: 87 BPM | WEIGHT: 100 LBS | BODY MASS INDEX: 20.16 KG/M2 | HEIGHT: 59 IN | TEMPERATURE: 98.7 F

## 2020-01-28 DIAGNOSIS — M77.8 TENDONITIS OF BOTH WRISTS: ICD-10-CM

## 2020-01-28 PROCEDURE — 99213 OFFICE O/P EST LOW 20 MIN: CPT | Performed by: NURSE PRACTITIONER

## 2020-01-28 ASSESSMENT — PAIN SCALES - GENERAL: PAINLEVEL: 3=SLIGHT PAIN

## 2020-01-28 ASSESSMENT — ENCOUNTER SYMPTOMS
CONSTITUTIONAL NEGATIVE: 1
CARDIOVASCULAR NEGATIVE: 1
MYALGIAS: 0
NEUROLOGICAL NEGATIVE: 1
PSYCHIATRIC NEGATIVE: 1
RESPIRATORY NEGATIVE: 1

## 2020-01-28 NOTE — LETTER
[address],   [address] [place] [address]  Phone:  [phone] - Fax:  [phone]   [facility] Progress Report and Disability Certification  Date of Service: 1/28/2020   No Show:  No  Date / Time of Next Visit:   Discharged/MMI   Released to Full Duty    Claim Information   Patient Name: Brenda Stephen  Claim Number:     Employer: [employer]  Date of Injury: 10/25/2019     Insurer / TPA: [employer]  ID / SSN:     Occupation:   Diagnosis: The encounter diagnosis was Tendonitis of both wrists.    Medical Information   Related to Industrial Injury?   Comments:Indeterminant     Subjective Complaints:  Date of injury: 10/24/2019. Injured at work: no specific trauma; notes onset of left hand/wrist/forearm pain associated with gripping trigger activity. Previous injury: Notes onset of right hand/wrist/forearm pain associated with work activity 2 months ago.       Today patient reports significant improvement with wrist pain.  Pain is described as intermitttent tightness, tingling that radiates from wrist to fingertips in both hands, dull, and achy pain.  Symptoms are exacerbated with repetitive movements and relieved with rest.  Patient states Mobic provides moderate relief.  Patient states that she applies heat with moderate relief of symptoms.  Patient states that she has been weaning wrist braces on both hands, primarily wearing at night.  Patient is tolerating work restrictions.  Patient states that she will get occasional pain, but it resolves quickly. Patient feels that hand therapy has significantly improved symptoms. Patient has completed hand therapy sessions and discharged as all metrics met. Patient will be released to full duty at this time.  Plan of care discussed with patient.        Objective Findings: Bilateral wrist:  No tenderness without swelling bilateral distal volar forearms, anterior wrists, proximal  hands.  Tendon function deficit, normal joint range of motion.  Vascular: Bilateral radial pulses intact, cap refill intact.  Neurological: Normal bilateral motor function, sensory to light touch grossly intact bilaterally   Tinel's positive   Phalen and Finkelstein negative      EMG results 12/30/19:  1.  Normal study.  2.  Electrophysiologic evidence of left or right cervical radiculopathy, brachial plexus apathy or any obvious focal entrapment neuropathy.   Pre-Existing Condition(s):     Assessment:   Condition Improved    Status: Discharged /  MMI  Permanent Disability:No    Plan:      Diagnostics:      Comments:  Discharged/MMI   Released to Full Duty   Follow-up as needed     Disability Information   Status: Released to Full Duty    From:     Through:   Restrictions are:     Physical Restrictions   Sitting:    Standing:    Stooping:    Bending:      Squatting:    Walking:    Climbing:    Pushing:      Pulling:    Other:    Reaching Above Shoulder (L):   Reaching Above Shoulder (R):       Reaching Below Shoulder (L):    Reaching Below Shoulder (R):      Not to exceed Weight Limits   Carrying(hrs):   Weight Limit(lb):   Lifting(hrs):   Weight  Limit(lb):     Comments:      Repetitive Actions   Hands: i.e. Fine Manipulations from Grasping:     Feet: i.e. Operating Foot Controls:     Driving / Operate Machinery:     Physician Name: BE Garcia Physician Signature:   e-Signature: Dr. Paul Reina, Medical Director   Clinic Name / Location: [address],   [address] [place] [address] Clinic Phone Number: Dept: [phone]   Appointment Time: 11:30 Am Visit Start Time: 11:36 AM   Check-In Time:  11:31 Am Visit Discharge Time:  12:05am   Original-Treating Physician or Chiropractor    Page 2-Insurer/TPA    Page 3-Employer    Page 4-Employee

## 2020-01-28 NOTE — PROGRESS NOTES
"Subjective:      Brenda Stephen is a 19 y.o. female who presents with Follow-Up (WC DOI 0/24/2019 Rt and Lt Arms, - Worse - RM 16)      Date of injury: 10/24/2019. Injured at work: no specific trauma; notes onset of left hand/wrist/forearm pain associated with gripping trigger activity. Previous injury: Notes onset of right hand/wrist/forearm pain associated with work activity 2 months ago.       Today patient reports significant improvement with wrist pain.  Pain is described as intermitttent tightness, tingling that radiates from wrist to fingertips in both hands, dull, and achy pain.  Symptoms are exacerbated with repetitive movements and relieved with rest.  Patient states Mobic provides moderate relief.  Patient states that she applies heat with moderate relief of symptoms.  Patient states that she has been weaning wrist braces on both hands, primarily wearing at night.  Patient is tolerating work restrictions.  Patient states that she will get occasional pain, but it resolves quickly. Patient feels that hand therapy has significantly improved symptoms. Patient has completed hand therapy sessions and discharged as all metrics met. Patient will be released to full duty at this time.  Plan of care discussed with patient.          HPI    Review of Systems   Constitutional: Negative.    Respiratory: Negative.    Cardiovascular: Negative.    Musculoskeletal: Negative for joint pain and myalgias.   Skin: Negative.    Neurological: Negative.    Psychiatric/Behavioral: Negative.         SOCHX: Works as a  at [employer]   FH: No pertinent family history to this problem.         Objective:     /60   Pulse 87   Temp 37.1 °C (98.7 °F) (Temporal)   Resp 14   Ht 1.499 m (4' 11\")   Wt 45.4 kg (100 lb)   LMP 01/01/2020   SpO2 97%   BMI 20.20 kg/m²      Physical Exam  Constitutional:       Appearance: Normal appearance.   Cardiovascular:      Rate and Rhythm: Normal rate and regular rhythm.      " Pulses: Normal pulses.   Musculoskeletal: Normal range of motion.         General: No swelling, tenderness, deformity or signs of injury.   Skin:     General: Skin is warm and dry.      Capillary Refill: Capillary refill takes less than 2 seconds.      Findings: No bruising or erythema.   Neurological:      General: No focal deficit present.      Mental Status: She is alert and oriented to person, place, and time.      Cranial Nerves: No cranial nerve deficit.      Sensory: No sensory deficit.      Motor: No weakness.      Coordination: Coordination normal.      Gait: Gait normal.      Deep Tendon Reflexes: Reflexes normal.   Psychiatric:         Mood and Affect: Mood normal.         Behavior: Behavior normal.         Bilateral wrist:  No tenderness without swelling bilateral distal volar forearms, anterior wrists, proximal hands.  Tendon function deficit, normal joint range of motion.  Vascular: Bilateral radial pulses intact, cap refill intact.  Neurological: Normal bilateral motor function, sensory to light touch grossly intact bilaterally   Tinel's positive   Phalen and Finkelstein negative      EMG results 12/30/19:  1.  Normal study.  2.  Electrophysiologic evidence of left or right cervical radiculopathy, brachial plexus apathy or any obvious focal entrapment neuropathy.       Assessment/Plan:       1. Tendonitis of both wrists    Discharged/MMI   Released to Full Duty   Follow-up as needed

## 2020-01-31 DIAGNOSIS — D64.9 ANEMIA, UNSPECIFIED TYPE: ICD-10-CM

## 2020-02-23 ENCOUNTER — OFFICE VISIT (OUTPATIENT)
Dept: URGENT CARE | Facility: CLINIC | Age: 20
End: 2020-02-23
Payer: COMMERCIAL

## 2020-02-23 VITALS
HEIGHT: 56 IN | DIASTOLIC BLOOD PRESSURE: 70 MMHG | BODY MASS INDEX: 22.67 KG/M2 | HEART RATE: 77 BPM | OXYGEN SATURATION: 98 % | RESPIRATION RATE: 14 BRPM | TEMPERATURE: 98.3 F | WEIGHT: 100.8 LBS | SYSTOLIC BLOOD PRESSURE: 100 MMHG

## 2020-02-23 DIAGNOSIS — S61.412A LACERATION OF LEFT HAND WITHOUT FOREIGN BODY, INITIAL ENCOUNTER: ICD-10-CM

## 2020-02-23 PROCEDURE — 90715 TDAP VACCINE 7 YRS/> IM: CPT | Performed by: NURSE PRACTITIONER

## 2020-02-23 PROCEDURE — 90471 IMMUNIZATION ADMIN: CPT | Performed by: NURSE PRACTITIONER

## 2020-02-23 PROCEDURE — 99214 OFFICE O/P EST MOD 30 MIN: CPT | Mod: 25 | Performed by: NURSE PRACTITIONER

## 2020-02-23 ASSESSMENT — ENCOUNTER SYMPTOMS
DIZZINESS: 0
NAUSEA: 0
CHILLS: 0
VOMITING: 0
MYALGIAS: 0
FEVER: 0

## 2020-02-23 NOTE — PROGRESS NOTES
Subjective:      Brenda Stephen is a 19 y.o. female who presents with Immunizations (Teatnus shot booster, metal and dusk with in her hands )            Laceration    The incident occurred 2 days ago. The laceration is located on the left hand. The laceration is 1 cm in size. Injury mechanism: A piece of plastic. The pain is at a severity of 0/10. The patient is experiencing no pain. She reports no foreign bodies present. Her tetanus status is out of date.   Patient reports to urgent care for recurrent problem.  States that she was cut with a piece of plastic 2 days ago that was very dirty and had flecks of metal in it and she was seen at another urgent care where they placed Steri-Strips on it.  Patient is not up-to-date on tetanus vaccination and wants to get that today due to the metal that she was possibly exposed to.    Review of Systems   Constitutional: Negative for chills and fever.   Gastrointestinal: Negative for nausea and vomiting.   Musculoskeletal: Negative for joint pain and myalgias.   Neurological: Negative for dizziness.       History reviewed. No pertinent past medical history. History reviewed. No pertinent surgical history.   Social History     Socioeconomic History   • Marital status: Single     Spouse name: Not on file   • Number of children: Not on file   • Years of education: Not on file   • Highest education level: Not on file   Occupational History   • Not on file   Social Needs   • Financial resource strain: Not on file   • Food insecurity     Worry: Not on file     Inability: Not on file   • Transportation needs     Medical: Not on file     Non-medical: Not on file   Tobacco Use   • Smoking status: Former Smoker     Packs/day: 0.00   • Smokeless tobacco: Never Used   Substance and Sexual Activity   • Alcohol use: Not Currently     Frequency: 2-3 times a week     Drinks per session: 1 or 2     Binge frequency: Never     Comment: 3-4/week   • Drug use: No   • Sexual activity: Never  "  Lifestyle   • Physical activity     Days per week: Not on file     Minutes per session: Not on file   • Stress: Not on file   Relationships   • Social connections     Talks on phone: Not on file     Gets together: Not on file     Attends Spiritism service: Not on file     Active member of club or organization: Not on file     Attends meetings of clubs or organizations: Not on file     Relationship status: Not on file   • Intimate partner violence     Fear of current or ex partner: Not on file     Emotionally abused: Not on file     Physically abused: Not on file     Forced sexual activity: Not on file   Other Topics Concern   • Not on file   Social History Narrative   • Not on file    Patient has no known allergies.       Objective:     /70   Pulse 77   Temp 36.8 °C (98.3 °F) (Temporal)   Resp 14   Ht 1.422 m (4' 8\")   Wt 45.7 kg (100 lb 12.8 oz)   SpO2 98%   BMI 22.60 kg/m²      Physical Exam  Vitals signs reviewed.   Constitutional:       Appearance: Normal appearance.   Cardiovascular:      Rate and Rhythm: Normal rate and regular rhythm.      Heart sounds: Normal heart sounds.   Pulmonary:      Effort: Pulmonary effort is normal.      Breath sounds: Normal breath sounds.   Musculoskeletal:        Hands:    Skin:     General: Skin is warm.      Capillary Refill: Capillary refill takes less than 2 seconds.   Neurological:      Mental Status: She is alert and oriented to person, place, and time.   Psychiatric:         Mood and Affect: Mood normal.         Behavior: Behavior normal.         Thought Content: Thought content normal.         Judgment: Judgment normal.                 Assessment/Plan:       1. Laceration of left hand without foreign body, initial encounter  - Tdap Vaccine =>7YO IM - Administered in clinic    Discussed physical examination findings are consistent with healing laceration and I find no evidence of infection.  Will give patient Tdap vaccine.  Discussed possible side effects " with patient    Supportive care and indications for immediate follow-up discussed with patient.    Pathogenesis of diagnosis discussed including typical length and natural progression. Patient expresses understanding and agrees to plan.      Please note that this dictation was created using voice recognition software. I have made every reasonable attempt to correct obvious errors, but I expect that there are errors of grammar and possibly content that I did not discover before finalizing the note.

## 2020-05-11 ENCOUNTER — OFFICE VISIT (OUTPATIENT)
Dept: URGENT CARE | Facility: PHYSICIAN GROUP | Age: 20
End: 2020-05-11
Payer: COMMERCIAL

## 2020-05-11 VITALS
BODY MASS INDEX: 20.72 KG/M2 | RESPIRATION RATE: 20 BRPM | TEMPERATURE: 98.4 F | OXYGEN SATURATION: 100 % | DIASTOLIC BLOOD PRESSURE: 82 MMHG | WEIGHT: 102.8 LBS | SYSTOLIC BLOOD PRESSURE: 112 MMHG | HEART RATE: 82 BPM | HEIGHT: 59 IN

## 2020-05-11 DIAGNOSIS — S40.011A CONTUSION OF RIGHT SHOULDER, INITIAL ENCOUNTER: ICD-10-CM

## 2020-05-11 DIAGNOSIS — S46.911A STRAIN OF RIGHT SHOULDER, INITIAL ENCOUNTER: ICD-10-CM

## 2020-05-11 PROCEDURE — 99213 OFFICE O/P EST LOW 20 MIN: CPT | Performed by: PHYSICIAN ASSISTANT

## 2020-05-11 ASSESSMENT — ENCOUNTER SYMPTOMS
SHORTNESS OF BREATH: 0
TINGLING: 0
SENSORY CHANGE: 0
VOMITING: 0
FEVER: 0
NAUSEA: 0
CHILLS: 0
PALPITATIONS: 0
BLURRED VISION: 0
SORE THROAT: 0

## 2020-05-11 ASSESSMENT — PAIN SCALES - GENERAL: PAINLEVEL: 9=SEVERE PAIN

## 2020-05-11 NOTE — LETTER
May 11, 2020         Patient: Brenda Stephen   YOB: 2000   Date of Visit: 5/11/2020           To Whom it May Concern:    Brenda Stephen was seen in my clinic on 5/11/2020. She may return to work on 5/14/2020.    If you have any questions or concerns, please don't hesitate to call.        Sincerely,           Anaid Espinoza P.A.-C.  Electronically Signed

## 2020-05-11 NOTE — PROGRESS NOTES
"Subjective:      Brenda Stephen is a 19 y.o. female who presents with Shoulder Pain ((R) shoulder, x2 days pt states no trauma or injury )      Shoulder Pain   This is a new problem. The current episode started in the past 7 days (2 days). The problem occurs constantly. The problem has been unchanged. Pertinent negatives include no chest pain, chills, fever, nausea, sore throat or vomiting. Exacerbated by: movement, palpation. She has tried ice, heat and NSAIDs for the symptoms. The treatment provided moderate relief.   No injury. No new activities. No weakness or numbness/tingling.      Review of Systems   Constitutional: Negative for chills and fever.   HENT: Negative for sore throat.    Eyes: Negative for blurred vision.   Respiratory: Negative for shortness of breath.    Cardiovascular: Negative for chest pain and palpitations.   Gastrointestinal: Negative for nausea and vomiting.   Musculoskeletal: Positive for joint pain (right shoulder).   Neurological: Negative for tingling and sensory change.       PMH:  has no past medical history on file.  MEDS:   Current Outpatient Medications:   •  diclofenac (SOLARAZE) 3 % gel, Apply 1 Application to affected area(s) 2 times a day. (Patient not taking: Reported on 5/11/2020), Disp: 50 g, Rfl: 0  ALLERGIES: No Known Allergies  SURGHX: No past surgical history on file.  SOCHX:  reports that she has quit smoking. She smoked 0.00 packs per day. She has never used smokeless tobacco. She reports previous alcohol use. She reports that she does not use drugs.  FH: Family history was reviewed, no pertinent findings to report     Objective:     /82 (BP Location: Left arm, Patient Position: Sitting, BP Cuff Size: Adult)   Pulse 82   Temp 36.9 °C (98.4 °F) (Temporal)   Resp 20   Ht 1.499 m (4' 11\")   Wt 46.6 kg (102 lb 12.8 oz)   SpO2 100%   BMI 20.76 kg/m²      Physical Exam  Constitutional:       Appearance: She is well-developed.   HENT:      Head: Normocephalic and " atraumatic.      Right Ear: External ear normal.      Left Ear: External ear normal.   Eyes:      Conjunctiva/sclera: Conjunctivae normal.      Pupils: Pupils are equal, round, and reactive to light.   Cardiovascular:      Rate and Rhythm: Normal rate and regular rhythm.      Heart sounds: Normal heart sounds. No murmur.   Pulmonary:      Effort: Pulmonary effort is normal.      Breath sounds: Normal breath sounds. No wheezing.   Musculoskeletal:      Comments: Right shoulder: Full ROM.  Pain with full overhead extension.  Pain with empty can test.  Mild bruising noted on posterior aspect of right shoulder.  No bony tenderness.  No swelling or deformity.  Distal N/V intact.  5/5 strength in upper extremities bilaterally.   Skin:     General: Skin is warm and dry.      Capillary Refill: Capillary refill takes less than 2 seconds.   Neurological:      Mental Status: She is alert and oriented to person, place, and time.   Psychiatric:         Behavior: Behavior normal.         Judgment: Judgment normal.            Assessment/Plan:     1. Contusion of right shoulder, initial encounter  - REFERRAL TO SPORTS MEDICINE    2. Strain of right shoulder, initial encounter  - REFERRAL TO SPORTS MEDICINE      Pain and physical exam findings seem most consistent with contusion/mild strain of the right shoulder.  Supportive care discussed.  Alternate ice/heat to affected area, OTC NSAIDs as needed, gentle ROM exercises as discussed.  Referral placed for sports medicine if patient is not having any improvement after 1 week.          Differential Diagnosis, natural history, and supportive care discussed. Return to the Urgent Care or follow up with your PCP if symptoms fail to resolve, or for any new or worsening symptoms. Emergency room precautions discussed. Patient and/or family appears understanding of information.

## 2020-05-21 ENCOUNTER — OFFICE VISIT (OUTPATIENT)
Dept: MEDICAL GROUP | Facility: CLINIC | Age: 20
End: 2020-05-21
Payer: COMMERCIAL

## 2020-05-21 VITALS
DIASTOLIC BLOOD PRESSURE: 76 MMHG | OXYGEN SATURATION: 97 % | HEIGHT: 59 IN | BODY MASS INDEX: 20.72 KG/M2 | WEIGHT: 102.8 LBS | TEMPERATURE: 98.9 F | RESPIRATION RATE: 16 BRPM | SYSTOLIC BLOOD PRESSURE: 116 MMHG | HEART RATE: 96 BPM

## 2020-05-21 DIAGNOSIS — M25.311 SHOULDER INSTABILITY, RIGHT: ICD-10-CM

## 2020-05-21 PROCEDURE — 99214 OFFICE O/P EST MOD 30 MIN: CPT | Performed by: FAMILY MEDICINE

## 2020-05-21 NOTE — PROGRESS NOTES
"CHIEF COMPLAINT:  Brenda Stephen female presenting at the request of Anaid Espinoza P.A.-C.  for evaluation of Shoulder pain.     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 No pertinent past medical history.    PMH:  has no past medical history on file.  MEDS: No current outpatient medications on file.  ALLERGIES: No Known Allergies  SURGHX: No past surgical history on file.  SOCHX:  reports that she has quit smoking. She smoked 0.00 packs per day. She has never used smokeless tobacco. She reports previous alcohol use. She reports that she does not use drugs.  FH: Family history was reviewed, no pertinent findings to report     PHYSICAL EXAM:  /76 (BP Location: Left arm, Patient Position: Sitting, BP Cuff Size: Adult)   Pulse 96   Temp 37.2 °C (98.9 °F) (Temporal)   Resp 16   Ht 1.499 m (4' 11\")   Wt 46.6 kg (102 lb 12.8 oz)   SpO2 97%   BMI 20.76 kg/m²      well-developed, well-nourished in no apparent distress, alert and oriented x 3.  Gait: normal    Cervical spine:  Range of motion Intact  Spurling's testing is NEGATIVE  Cervical spine tenderness NEGATIVE    Strength testing:     Deltoid, bilateral 5/5  Bicep, bilateral 5/5  Tricep, bilateral 5/5  Wrist Extension, bilateral 5/5  Wrist " Flexion, bilateral 5/5  Finger Abduction, bilateral 5/5    Sensation:  INTACT Bilaterally    Reflexes:   Biceps: R 2+/L 2+  Triceps: R 2+/L  2+  Brachial radialis R 2+/L  2+  Bearden's testing is NEGATIVE  The arms are otherwise neurovascularly intact     Shoulder Exam:    RIGHT Shoulder:  No visible swelling   Range of motion INTACT  Tenderness: Non-tender  Empty Can Testing 5/5  Internal Rotation 5/5  External Rotation 5/5  Lift Off Testing 5/5  Impingement testing Garza  NEGATIVE  Neer's testing NEGATIVE  Apprehension testing POSITIVE  Relocation testing POSITIVE  Rodriguez's Testing POSITIVE  Grind Testing POSITIVE    LEFT Shoulder:  No visible swelling   Range of motion INTACT  Tenderness: Non-tender  Empty Can Testing 5/5  Internal Rotation 5/5  External Rotation 5/5  Lift Off Testing 5/5  Impingement testing Garza  NEGATIVE  Neer's testing NEGATIVE  Apprehension testing NEGATIVE  Relocation testing NEGATIVE  Rodriguez's Testing NEGATIVE  Grind Testing NEGATIVE    Additional Findings: None    1. Shoulder instability, right  REFERRAL TO PHYSICAL THERAPY Reason for Therapy: Eval/Treat/Report     RIGHT shoulder instability  Crepitus and grind testing    In the absence of history of trauma, recommend formal physical therapy to strengthen the shoulder and periscapular muscles ([facility])    Home exercise program was also provided    Return in about 4 weeks (around 6/18/2020).  To see how she is doing formal physical therapy and home exercise program    No imaging was performed, if symptoms persist or worsen consider x-ray/or possibly MRI    Thank you Anaid Espinoza P.A.-C. for allowing me to participate in caring for your patient.

## 2020-05-26 ENCOUNTER — OFFICE VISIT (OUTPATIENT)
Dept: MEDICAL GROUP | Facility: MEDICAL CENTER | Age: 20
End: 2020-05-26
Payer: COMMERCIAL

## 2020-05-26 VITALS
TEMPERATURE: 100 F | HEART RATE: 102 BPM | OXYGEN SATURATION: 98 % | HEIGHT: 59 IN | SYSTOLIC BLOOD PRESSURE: 110 MMHG | DIASTOLIC BLOOD PRESSURE: 78 MMHG | RESPIRATION RATE: 20 BRPM | WEIGHT: 104.06 LBS | BODY MASS INDEX: 20.98 KG/M2

## 2020-05-26 DIAGNOSIS — M25.511 ACUTE PAIN OF RIGHT SHOULDER: ICD-10-CM

## 2020-05-26 PROCEDURE — 99213 OFFICE O/P EST LOW 20 MIN: CPT | Performed by: PHYSICIAN ASSISTANT

## 2020-05-26 NOTE — PROGRESS NOTES
"Chief Complaint   Patient presents with   • Shoulder Injury     patient is wanting a drs note       HPI  Brenda Stephen is a 19 y.o. female here today for R shoulder pain X 2 wks.   R shoulder pain. started 2 wks ago, no trauma, woke up w pain, achy pain w tingling, cracking and popping, was seen at   and has brenna at PT. Has tried ibuprofen w some help. Rest and icing has been most helpful.  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   Medications and allergies reviewed and updated in Epic chart by me today.     ROS:   As documented in history of present illness above    Exam:  /78 (BP Location: Right arm, Patient Position: Sitting, BP Cuff Size: Adult)   Pulse (!) 102   Temp 37.8 °C (100 °F) (Temporal)   Resp 20   Ht 1.499 m (4' 11\")   Wt 47.2 kg (104 lb 0.9 oz)   SpO2 98%   Constitutional: Alert, no distress, plus 3 vital signs  Skin:  Warm, dry, no rashes invisible areas  Eye: Equal, round and reactive, conjunctiva clear  MS: pos pain w lifting arm over shoulder, has full ROM   Psych: Alert, pleasant, well-groomed, normal affect    A/P:    1. Acute pain of right shoulder  Letter given     F/u prn     "

## 2020-05-26 NOTE — LETTER
May 26, 2020         Patient: Brenda Stephen   YOB: 2000   Date of Visit: 5/26/2020           To Whom it May Concern:    Brenda Stephen was seen in my clinic on 5/26/2020. She may return to work on 6/5/20.    If you have any questions or concerns, please don't hesitate to call.        Sincerely,           Abigail Paula P.A.-C.  Electronically Signed

## 2020-05-26 NOTE — LETTER
May 26, 2020       Patient: Brenda Stephen   YOB: 2000   Date of Visit: 5/26/2020         To Whom It May Concern:      Brenda Stephen was seen in my clinic on 5/26/2020. She may return to work on 6/5/20.  In my medical opinion, I recommend that Brenda Stephen return to light duty with the following restrictions, no above head activity, no lifting more than 10 lbs until reevaluated.    If you have any questions or concerns, please don't hesitate to call [phone]          Sincerely,          Abigail Paula P.A.-C.  Electronically Signed